# Patient Record
Sex: MALE | Race: WHITE | NOT HISPANIC OR LATINO
[De-identification: names, ages, dates, MRNs, and addresses within clinical notes are randomized per-mention and may not be internally consistent; named-entity substitution may affect disease eponyms.]

---

## 2017-03-28 ENCOUNTER — RX RENEWAL (OUTPATIENT)
Age: 72
End: 2017-03-28

## 2017-08-27 ENCOUNTER — RX RENEWAL (OUTPATIENT)
Age: 72
End: 2017-08-27

## 2018-05-21 ENCOUNTER — RX RENEWAL (OUTPATIENT)
Age: 73
End: 2018-05-21

## 2018-07-20 ENCOUNTER — RX RENEWAL (OUTPATIENT)
Age: 73
End: 2018-07-20

## 2018-08-28 ENCOUNTER — RX RENEWAL (OUTPATIENT)
Age: 73
End: 2018-08-28

## 2018-08-29 RX ORDER — LEVOTHYROXINE SODIUM 0.05 MG/1
50 TABLET ORAL DAILY
Qty: 90 | Refills: 0 | Status: DISCONTINUED | COMMUNITY
End: 2018-08-29

## 2018-10-15 ENCOUNTER — LABORATORY RESULT (OUTPATIENT)
Age: 73
End: 2018-10-15

## 2018-10-17 RX ORDER — VALSARTAN 160 MG/1
160 TABLET, COATED ORAL
Refills: 5 | Status: ACTIVE | COMMUNITY

## 2018-10-17 RX ORDER — EMPAGLIFLOZIN 25 MG/1
25 TABLET, FILM COATED ORAL DAILY
Refills: 0 | Status: ACTIVE | COMMUNITY

## 2018-10-17 RX ORDER — FLAXSEED OIL 1000 MG
1000 CAPSULE ORAL DAILY
Refills: 0 | Status: ACTIVE | COMMUNITY

## 2018-10-17 RX ORDER — VITAMIN K2 90 MCG
125 MCG CAPSULE ORAL DAILY
Refills: 0 | Status: ACTIVE | COMMUNITY

## 2018-10-17 RX ORDER — ROSUVASTATIN CALCIUM 10 MG/1
10 TABLET, FILM COATED ORAL DAILY
Refills: 0 | Status: ACTIVE | COMMUNITY

## 2018-10-19 ENCOUNTER — APPOINTMENT (OUTPATIENT)
Dept: ENDOCRINOLOGY | Facility: CLINIC | Age: 73
End: 2018-10-19
Payer: MEDICARE

## 2018-10-19 ENCOUNTER — APPOINTMENT (OUTPATIENT)
Dept: ENDOCRINOLOGY | Facility: CLINIC | Age: 73
End: 2018-10-19

## 2018-10-19 PROCEDURE — 76536 US EXAM OF HEAD AND NECK: CPT

## 2018-11-02 ENCOUNTER — APPOINTMENT (OUTPATIENT)
Dept: ENDOCRINOLOGY | Facility: CLINIC | Age: 73
End: 2018-11-02
Payer: MEDICARE

## 2018-11-02 ENCOUNTER — RESULT CHARGE (OUTPATIENT)
Age: 73
End: 2018-11-02

## 2018-11-02 ENCOUNTER — APPOINTMENT (OUTPATIENT)
Dept: ENDOCRINOLOGY | Facility: CLINIC | Age: 73
End: 2018-11-02

## 2018-11-02 VITALS
DIASTOLIC BLOOD PRESSURE: 64 MMHG | BODY MASS INDEX: 33.08 KG/M2 | HEIGHT: 73.5 IN | OXYGEN SATURATION: 98 % | SYSTOLIC BLOOD PRESSURE: 138 MMHG | HEART RATE: 64 BPM | WEIGHT: 255 LBS

## 2018-11-02 LAB — GLUCOSE BLDC GLUCOMTR-MCNC: 119

## 2018-11-02 PROCEDURE — 99214 OFFICE O/P EST MOD 30 MIN: CPT | Mod: 25

## 2018-11-02 PROCEDURE — 82962 GLUCOSE BLOOD TEST: CPT

## 2018-11-02 RX ORDER — LEVOTHYROXINE SODIUM 0.05 MG/1
50 TABLET ORAL DAILY
Qty: 90 | Refills: 1 | Status: DISCONTINUED | COMMUNITY
Start: 2018-08-28 | End: 2018-11-02

## 2019-01-16 ENCOUNTER — RX RENEWAL (OUTPATIENT)
Age: 74
End: 2019-01-16

## 2019-03-07 ENCOUNTER — TRANSCRIPTION ENCOUNTER (OUTPATIENT)
Age: 74
End: 2019-03-07

## 2019-03-07 LAB
ALBUMIN SERPL ELPH-MCNC: 4.3 G/DL
ALP BLD-CCNC: 59 U/L
ALT SERPL-CCNC: 19 U/L
ANION GAP SERPL CALC-SCNC: 15 MMOL/L
AST SERPL-CCNC: 21 U/L
BASOPHILS # BLD AUTO: 0.05 K/UL
BASOPHILS NFR BLD AUTO: 0.8 %
BILIRUB SERPL-MCNC: 0.3 MG/DL
BUN SERPL-MCNC: 18 MG/DL
CALCIUM SERPL-MCNC: 9.4 MG/DL
CHLORIDE SERPL-SCNC: 104 MMOL/L
CHOLEST SERPL-MCNC: 116 MG/DL
CHOLEST/HDLC SERPL: 3.2 RATIO
CO2 SERPL-SCNC: 22 MMOL/L
CREAT SERPL-MCNC: 1.07 MG/DL
CREAT SPEC-SCNC: 113 MG/DL
EOSINOPHIL # BLD AUTO: 0.16 K/UL
EOSINOPHIL NFR BLD AUTO: 2.6 %
GLUCOSE SERPL-MCNC: 129 MG/DL
HBA1C MFR BLD HPLC: 6.1 %
HCT VFR BLD CALC: 44.7 %
HDLC SERPL-MCNC: 36 MG/DL
HGB BLD-MCNC: 14.1 G/DL
IMM GRANULOCYTES NFR BLD AUTO: 0.5 %
LDLC SERPL CALC-MCNC: 46 MG/DL
LYMPHOCYTES # BLD AUTO: 1.29 K/UL
LYMPHOCYTES NFR BLD AUTO: 21.3 %
MAN DIFF?: NORMAL
MCHC RBC-ENTMCNC: 28.3 PG
MCHC RBC-ENTMCNC: 31.5 GM/DL
MCV RBC AUTO: 89.6 FL
MICROALBUMIN 24H UR DL<=1MG/L-MCNC: <1.2 MG/DL
MICROALBUMIN/CREAT 24H UR-RTO: NORMAL MG/G
MONOCYTES # BLD AUTO: 0.44 K/UL
MONOCYTES NFR BLD AUTO: 7.3 %
NEUTROPHILS # BLD AUTO: 4.09 K/UL
NEUTROPHILS NFR BLD AUTO: 67.5 %
PLATELET # BLD AUTO: 198 K/UL
POTASSIUM SERPL-SCNC: 4.6 MMOL/L
PROT SERPL-MCNC: 6.7 G/DL
RBC # BLD: 4.99 M/UL
RBC # FLD: 14.9 %
SODIUM SERPL-SCNC: 141 MMOL/L
T4 FREE SERPL-MCNC: 1.1 NG/DL
TRIGL SERPL-MCNC: 169 MG/DL
TSH SERPL-ACNC: 2.72 UIU/ML
WBC # FLD AUTO: 6.06 K/UL

## 2019-03-08 ENCOUNTER — RECORD ABSTRACTING (OUTPATIENT)
Age: 74
End: 2019-03-08

## 2019-03-08 DIAGNOSIS — Z86.39 PERSONAL HISTORY OF OTHER ENDOCRINE, NUTRITIONAL AND METABOLIC DISEASE: ICD-10-CM

## 2019-03-08 DIAGNOSIS — Z78.9 OTHER SPECIFIED HEALTH STATUS: ICD-10-CM

## 2019-03-11 ENCOUNTER — RESULT CHARGE (OUTPATIENT)
Age: 74
End: 2019-03-11

## 2019-03-11 ENCOUNTER — APPOINTMENT (OUTPATIENT)
Dept: ENDOCRINOLOGY | Facility: CLINIC | Age: 74
End: 2019-03-11
Payer: MEDICARE

## 2019-03-11 VITALS
SYSTOLIC BLOOD PRESSURE: 138 MMHG | HEART RATE: 69 BPM | HEIGHT: 73 IN | BODY MASS INDEX: 34.33 KG/M2 | DIASTOLIC BLOOD PRESSURE: 70 MMHG | WEIGHT: 259 LBS

## 2019-03-11 LAB — GLUCOSE BLDC GLUCOMTR-MCNC: 129

## 2019-03-11 PROCEDURE — 82962 GLUCOSE BLOOD TEST: CPT

## 2019-03-11 PROCEDURE — 99214 OFFICE O/P EST MOD 30 MIN: CPT | Mod: 25

## 2019-03-11 RX ORDER — LEVOTHYROXINE SODIUM 0.05 MG/1
50 TABLET ORAL
Refills: 0 | Status: DISCONTINUED | COMMUNITY
End: 2019-03-11

## 2019-03-11 NOTE — HISTORY OF PRESENT ILLNESS
[FreeTextEntry1] : Patient is seen today for a routine diabetic follow up.  Also with thyroid nodules and hypothyroidism.  \par Quality:  type 2\par Severity:  well controlled\par Duration of diabetes:  since 2003\par Associated Complications/ Symptoms:  foot ulcer on left in the past due to foot brace\par Modifying Factors:  Better with medication\par \par Patient tests blood glucose 1 times per day.   \par \par Current Diabetic Medication Regimen:\par Janumet XR 50/1000 mg BID\par Jardiance 25 mg daily\par \par hx of large left thyroid nodule s/p benign FNA in 2004.\par US in 2017 showed enlarged LMP 4.5 cm nodule and new LLP 2.5 cm nodule, both benign on FNA in 11/2017.\par Also had attempted FNA of RMP 1.4 cm nodule, but only rare follicular cells seen (non-diagnostic). \par \par PCP prescribed medrol dose pack for right leg pain and ? nerve compression.  Will be going to PT.

## 2019-03-11 NOTE — ASSESSMENT
[FreeTextEntry1] : 74 year old male with type 2 DM, Multinodular goiter s/p benign FNA of left mid pole 4.6 cm nodule and left lower pole 2.5 cm nodule in 2017, mild primary hypothyroidism, hyperlipidemia and HTN.   His glycemic control is excellent.  \par \par 1.  Type 2 DM-  continue current Rx\par 2.  Multinodular goiter- repeat US in 10/2019\par 3.  hypothyroidism-  Now euthyroid, continue LT4 75 mcg daily.\par 4.  hyperlipidemia-  continue rosuvastatin\par 5.  HTN-  continue ARB.

## 2019-03-11 NOTE — PHYSICAL EXAM
[No Acute Distress] : no acute distress [Well Nourished] : well nourished [Normal Sclera/Conjunctiva] : normal sclera/conjunctiva [No Proptosis] : no proptosis [No LAD] : no lymphadenopathy [No Respiratory Distress] : no respiratory distress [Clear to Auscultation] : lungs were clear to auscultation bilaterally [Normal Rate] : heart rate was normal  [Normal S1, S2] : normal S1 and S2 [Regular Rhythm] : with a regular rhythm [Murmurs] : no murmurs [Normal Gait] : normal gait [No Clubbing, Cyanosis] : no clubbing  or cyanosis of the fingernails [Normal Insight/Judgement] : insight and judgment were intact [Normal Affect] : the affect was normal [Normal Mood] : the mood was normal [Acanthosis Nigricans] : no acanthosis nigricans [de-identified] : nodular thyroid, no LAD

## 2019-03-11 NOTE — REVIEW OF SYSTEMS
[Joint Pain] : joint pain [Muscle Weakness] : muscle weakness [Recent Weight Gain (___ Lbs)] : no recent weight gain [Recent Weight Loss (___ Lbs)] : no recent weight loss [Chest Pain] : no chest pain [Shortness Of Breath] : no shortness of breath [Nausea] : no nausea [Pain/Numbness of Digits] : no pain/numbness of digits

## 2019-04-29 ENCOUNTER — RX RENEWAL (OUTPATIENT)
Age: 74
End: 2019-04-29

## 2019-07-12 ENCOUNTER — RX RENEWAL (OUTPATIENT)
Age: 74
End: 2019-07-12

## 2019-07-17 ENCOUNTER — APPOINTMENT (OUTPATIENT)
Dept: PULMONOLOGY | Facility: CLINIC | Age: 74
End: 2019-07-17

## 2019-08-13 LAB
ALBUMIN SERPL ELPH-MCNC: 4.3 G/DL
ALP BLD-CCNC: 44 U/L
ALT SERPL-CCNC: 20 U/L
ANION GAP SERPL CALC-SCNC: 9 MMOL/L
AST SERPL-CCNC: 21 U/L
BILIRUB SERPL-MCNC: 0.3 MG/DL
BUN SERPL-MCNC: 13 MG/DL
CALCIUM SERPL-MCNC: 9.9 MG/DL
CHLORIDE SERPL-SCNC: 106 MMOL/L
CHOLEST SERPL-MCNC: 122 MG/DL
CHOLEST/HDLC SERPL: 3.8 RATIO
CO2 SERPL-SCNC: 26 MMOL/L
CREAT SERPL-MCNC: 1.02 MG/DL
GLUCOSE SERPL-MCNC: 124 MG/DL
HBA1C MFR BLD HPLC: 5.7 %
HDLC SERPL-MCNC: 32 MG/DL
LDLC SERPL CALC-MCNC: 43 MG/DL
POTASSIUM SERPL-SCNC: 4 MMOL/L
PROT SERPL-MCNC: 6.8 G/DL
SODIUM SERPL-SCNC: 141 MMOL/L
T4 FREE SERPL-MCNC: 1.5 NG/DL
TRIGL SERPL-MCNC: 233 MG/DL
TSH SERPL-ACNC: 0.52 UIU/ML

## 2019-08-14 ENCOUNTER — APPOINTMENT (OUTPATIENT)
Dept: ENDOCRINOLOGY | Facility: CLINIC | Age: 74
End: 2019-08-14
Payer: MEDICARE

## 2019-08-14 VITALS
SYSTOLIC BLOOD PRESSURE: 134 MMHG | WEIGHT: 250 LBS | HEART RATE: 70 BPM | BODY MASS INDEX: 33.13 KG/M2 | HEIGHT: 73 IN | DIASTOLIC BLOOD PRESSURE: 74 MMHG

## 2019-08-14 LAB — GLUCOSE BLDC GLUCOMTR-MCNC: 96

## 2019-08-14 PROCEDURE — 99214 OFFICE O/P EST MOD 30 MIN: CPT | Mod: 25

## 2019-08-14 PROCEDURE — 82962 GLUCOSE BLOOD TEST: CPT

## 2019-08-14 NOTE — HISTORY OF PRESENT ILLNESS
[FreeTextEntry1] : Patient is seen today for a routine diabetic follow up.  Also with thyroid nodules and hypothyroidism.  History of a large left thyroid nodule s/p benign FNA in 2004. US in 2017 showed enlarged Left mid pole 4.5 cm nodule and new left lower pole 2.5 cm nodule, both benign on FNA in 11/2017.Also had attempted FNA of Right mid pole 1.4 cm nodule, but only rare follicular cells seen (non-diagnostic). \par \par Quality:  type 2  DM\par Severity:  well controlled\par Duration of diabetes:  since 2003\par Associated Complications/ Symptoms:  foot ulcer on left in the past due to foot brace\par Modifying Factors:  Better with medication\par \par Patient tests blood glucose 1 times per day.   Reports improved BG lately.  \par \par Current Diabetic Medication Regimen:\par Janumet XR 50/1000 mg BID\par Jardiance 25 mg daily\par \par Was hospitalized in July for perforated appendicitis and now on IV Zosyn.    No surgery performed.  Seeing ID and surgeon now.

## 2019-08-14 NOTE — ASSESSMENT
[FreeTextEntry1] : 74 year old male with type 2 DM, Multinodular goiter s/p benign FNA of left mid pole 4.6 cm nodule and left lower pole 2.5 cm nodule in 2017, mild primary hypothyroidism, hyperlipidemia and HTN.   His glycemic control is excellent.  \par \par 1.  Type 2 DM-  continue current Rx\par 2.  Multinodular goiter- repeat US in 10/2019\par 3.  hypothyroidism-  Euthyroid, continue LT4 75 mcg daily.\par 4.  hyperlipidemia-  continue rosuvastatin\par 5.  HTN-  continue ARB.

## 2019-08-14 NOTE — REVIEW OF SYSTEMS
[Recent Weight Loss (___ Lbs)] : recent [unfilled] ~Ulb weight loss [Shortness Of Breath] : no shortness of breath [Chest Pain] : no chest pain [Abdominal Pain] : no abdominal pain [Polydipsia] : no polydipsia

## 2019-08-14 NOTE — PHYSICAL EXAM
[No Acute Distress] : no acute distress [Well Nourished] : well nourished [No Proptosis] : no proptosis [Normal Sclera/Conjunctiva] : normal sclera/conjunctiva [No LAD] : no lymphadenopathy [No Respiratory Distress] : no respiratory distress [Normal Rate] : heart rate was normal  [Clear to Auscultation] : lungs were clear to auscultation bilaterally [Normal S1, S2] : normal S1 and S2 [Regular Rhythm] : with a regular rhythm [Normal Gait] : normal gait [Murmurs] : no murmurs [No Clubbing, Cyanosis] : no clubbing  or cyanosis of the fingernails [Normal Insight/Judgement] : insight and judgment were intact [Normal Mood] : the mood was normal [Normal Affect] : the affect was normal [Acanthosis Nigricans] : no acanthosis nigricans [de-identified] : nodular thyroid, no LAD

## 2019-10-22 ENCOUNTER — RX RENEWAL (OUTPATIENT)
Age: 74
End: 2019-10-22

## 2019-10-24 ENCOUNTER — APPOINTMENT (OUTPATIENT)
Dept: ENDOCRINOLOGY | Facility: CLINIC | Age: 74
End: 2019-10-24
Payer: MEDICARE

## 2019-10-24 PROCEDURE — 76536 US EXAM OF HEAD AND NECK: CPT

## 2019-10-30 ENCOUNTER — TRANSCRIPTION ENCOUNTER (OUTPATIENT)
Age: 74
End: 2019-10-30

## 2019-10-30 NOTE — PROCEDURE
[Other: ___] : [unfilled]. All measurements will be reported as longitudinal x emi-posterior x transverse. [Report dated ___] : Report dated [unfilled] [Multinodular Goiter] : multinodular goiter [] : a heterogeneous parenchyma  [Mid] : mid pole there is a  [Right Thyroid] : right [Hypoechoic] : hypoechoic nodule [Left Thyroid] : left [Lower] : lower pole there is a  [Isoechoic] : isoechoic nodule [FreeTextEntry1] : 3.5 x 1.8 x 1.3 [FreeTextEntry5] : 6 x 2.8 x 3.2 [FreeTextEntry2] : 0.3 [de-identified] : The previously biopsied large left 4.2 cm nodule is not clearly visualized on this study in background of heterogeneous lobe.     [FreeTextEntry3] : 2.2 x 2.5 x 1.6 [FreeTextEntry4] : The nodule is stable (s/p benign FNA in 2017).

## 2019-10-30 NOTE — IMPRESSION
[FreeTextEntry1] : Heterogeneous, multinodular gland with several stable nodules as described above.  The previously visualized left 4.2 cm nodule was not clearly identified on this study.  Would repeat imaging in one year for continued surveillance.

## 2020-01-08 ENCOUNTER — RX RENEWAL (OUTPATIENT)
Age: 75
End: 2020-01-08

## 2020-02-24 ENCOUNTER — APPOINTMENT (OUTPATIENT)
Dept: ENDOCRINOLOGY | Facility: CLINIC | Age: 75
End: 2020-02-24
Payer: MEDICARE

## 2020-02-24 VITALS
BODY MASS INDEX: 29.62 KG/M2 | DIASTOLIC BLOOD PRESSURE: 70 MMHG | SYSTOLIC BLOOD PRESSURE: 122 MMHG | HEIGHT: 78 IN | WEIGHT: 256 LBS

## 2020-02-24 LAB
ALBUMIN SERPL ELPH-MCNC: 4.2 G/DL
ALP BLD-CCNC: 52 U/L
ALT SERPL-CCNC: 23 U/L
ANION GAP SERPL CALC-SCNC: 12 MMOL/L
AST SERPL-CCNC: 20 U/L
BASOPHILS # BLD AUTO: 0.02 K/UL
BASOPHILS NFR BLD AUTO: 0.4 %
BILIRUB SERPL-MCNC: 0.3 MG/DL
BUN SERPL-MCNC: 13 MG/DL
CALCIUM SERPL-MCNC: 9.1 MG/DL
CHLORIDE SERPL-SCNC: 104 MMOL/L
CHOLEST SERPL-MCNC: 114 MG/DL
CHOLEST/HDLC SERPL: 2.9 RATIO
CO2 SERPL-SCNC: 25 MMOL/L
CREAT SERPL-MCNC: 0.96 MG/DL
CREAT SPEC-SCNC: 82 MG/DL
EOSINOPHIL # BLD AUTO: 0.18 K/UL
EOSINOPHIL NFR BLD AUTO: 3.7 %
ESTIMATED AVERAGE GLUCOSE: 120 MG/DL
GLUCOSE SERPL-MCNC: 131 MG/DL
HBA1C MFR BLD HPLC: 5.8 %
HCT VFR BLD CALC: 44.2 %
HDLC SERPL-MCNC: 39 MG/DL
HGB BLD-MCNC: 14 G/DL
IMM GRANULOCYTES NFR BLD AUTO: 0.6 %
LDLC SERPL CALC-MCNC: 49 MG/DL
LYMPHOCYTES # BLD AUTO: 0.96 K/UL
LYMPHOCYTES NFR BLD AUTO: 20 %
MAN DIFF?: NORMAL
MCHC RBC-ENTMCNC: 28.2 PG
MCHC RBC-ENTMCNC: 31.7 GM/DL
MCV RBC AUTO: 89.1 FL
MICROALBUMIN 24H UR DL<=1MG/L-MCNC: <1.2 MG/DL
MICROALBUMIN/CREAT 24H UR-RTO: NORMAL MG/G
MONOCYTES # BLD AUTO: 0.44 K/UL
MONOCYTES NFR BLD AUTO: 9.1 %
NEUTROPHILS # BLD AUTO: 3.18 K/UL
NEUTROPHILS NFR BLD AUTO: 66.2 %
PLATELET # BLD AUTO: 173 K/UL
POTASSIUM SERPL-SCNC: 4.5 MMOL/L
PROT SERPL-MCNC: 6.3 G/DL
RBC # BLD: 4.96 M/UL
RBC # FLD: 14.9 %
SODIUM SERPL-SCNC: 141 MMOL/L
T4 FREE SERPL-MCNC: 1.1 NG/DL
TRIGL SERPL-MCNC: 132 MG/DL
TSH SERPL-ACNC: 2.36 UIU/ML
WBC # FLD AUTO: 4.81 K/UL

## 2020-02-24 PROCEDURE — 99214 OFFICE O/P EST MOD 30 MIN: CPT

## 2020-02-24 NOTE — REVIEW OF SYSTEMS
[Fatigue] : fatigue [Visual Field Defect] : visual field defect [SOB on Exertion] : shortness of breath during exertion [Polyuria] : polyuria [Nocturia] : nocturia [Recent Weight Gain (___ Lbs)] : no recent weight gain [Recent Weight Loss (___ Lbs)] : no recent weight loss [Dysphagia] : no dysphagia [Blurry Vision] : no blurred vision [Dysphonia] : no dysphonia [Neck Pain] : no neck pain [Palpitations] : no palpitations [Chest Pain] : no chest pain [Nausea] : no nausea [Vomiting] : no vomiting was observed [Constipation] : no constipation [Joint Pain] : no joint pain [Diarrhea] : no diarrhea [Joint Stiffness] : no joint stiffness [Acanthosis] : no acanthosis  [Headache] : no headaches [Dry Skin] : no dry skin [Tremors] : no tremors [Depression] : no depression [Anxiety] : no anxiety [Polydipsia] : no polydipsia [Heat Intolerance] : heat tolerant [Cold Intolerance] : cold tolerant [FreeTextEntry3] : has new glasses

## 2020-02-24 NOTE — ASSESSMENT
[FreeTextEntry1] : T2DM\par -Continue Janumet and Jardiance RX\par -Continue to exercise as tolerated, goal of 30 mins a day 5x a week\par -Eat "junk food" in moderation\par -Continue to follow up with ophtho and podiatry\par \par \par HLD\par -Continue statin\par \par HTN\par -Continue ARB\par \par Hypothyroid\par -Continue current dose of LT4\par \par \par Thyroid Nodules\par -Repeat thyroid ultrasound 10/2020 \par \par Labs before next apt with Dr. Alfaro 7/2020

## 2020-02-24 NOTE — PHYSICAL EXAM
[Alert] : alert [Well Nourished] : well nourished [No Acute Distress] : no acute distress [Normal Sclera/Conjunctiva] : normal sclera/conjunctiva [Well Developed] : well developed [Supple] : the neck was supple [Normal Hearing] : hearing was normal [No Accessory Muscle Use] : no accessory muscle use [Normal Rate and Effort] : normal respiratory rhythm and effort [Clear to Auscultation] : lungs were clear to auscultation bilaterally [Normal Rate] : heart rate was normal  [Normal S1, S2] : normal S1 and S2 [Regular Rhythm] : with a regular rhythm [No Edema] : there was no peripheral edema [Not Tender] : non-tender [Soft] : abdomen soft [Post Cervical Nodes] : posterior cervical nodes [Normal] : normal and non tender [Normal Gait] : normal gait [Anterior Cervical Nodes] : anterior cervical nodes [Acanthosis Nigricans] : no acanthosis nigricans [No Tremors] : no tremors [No Rash] : no rash [Oriented x3] : oriented to person, place, and time

## 2020-02-24 NOTE — HISTORY OF PRESENT ILLNESS
[FreeTextEntry1] : Quality: type 2 DM\par Severity: well controlled\par Duration of diabetes: since 2003\par Associated Complications/ Symptoms: foot ulcer on left in the past due to foot brace\par Modifying Factors: Better with medication\par \par SMBG\par Checks BS approx daily at home, always fasting around 120s\par \par HGA1C 5.8\par \par Current drug regimen\par Janumet XR 50/1000 BID\par Jardiance 25 mg daily\par \par Eye exam: 1/2020- denies DR \par Foot exam: sees podiatry every 2 months, cuts the toe nails- denies numbness/tingling in b/l feet \par Kidney disease: denies \par Heart disease: denies \par \par Weight: relatively stable\par Diet: pt cooks 70%, eats out about 1x a week\par B: toast or roll, sometimes eggs\par L: leftovers/cold cuts\par D: meat, veggie, starch\par S: tortilla chips,once and a while chocolate \par Exercise: walks the dog when weather allows \par Smoking: denies \par \par \par HLD\par Triglycerides 132, Cholesterol 114, LDL 49, HDl 39\par Rosuvastatin 10 mg daily\par \par HTN\par BP in office 122/70 \par Valsartan 160 mg daily\par \par HypoThyroid\par TFTs WNL\par LT4 75 mcg daily\par Patient advised to take every day in the morning, on an empty stomach, and with no other medications. \par \par Thyroid nodules\par  s/p benign FNA of left mid pole 4.6 cm nodule and left lower pole 2.5 cm nodule in 2017\par 10/2019 ultrasound -stable nodules

## 2020-04-25 ENCOUNTER — RX RENEWAL (OUTPATIENT)
Age: 75
End: 2020-04-25

## 2020-07-01 ENCOUNTER — APPOINTMENT (OUTPATIENT)
Dept: ENDOCRINOLOGY | Facility: CLINIC | Age: 75
End: 2020-07-01
Payer: MEDICARE

## 2020-07-01 VITALS
HEIGHT: 78 IN | BODY MASS INDEX: 29.85 KG/M2 | WEIGHT: 258 LBS | DIASTOLIC BLOOD PRESSURE: 80 MMHG | SYSTOLIC BLOOD PRESSURE: 130 MMHG | HEART RATE: 69 BPM

## 2020-07-01 DIAGNOSIS — E03.9 HYPOTHYROIDISM, UNSPECIFIED: ICD-10-CM

## 2020-07-01 LAB — GLUCOSE BLDC GLUCOMTR-MCNC: 92

## 2020-07-01 PROCEDURE — 99214 OFFICE O/P EST MOD 30 MIN: CPT | Mod: 25

## 2020-07-01 PROCEDURE — 36415 COLL VENOUS BLD VENIPUNCTURE: CPT

## 2020-07-01 PROCEDURE — 82962 GLUCOSE BLOOD TEST: CPT

## 2020-07-01 RX ORDER — PIPERACILLIN SODIUM AND TAZOBACTAM SODIUM 4; .5 G/20ML; G/20ML
4.5 (4-0.5) INJECTION, POWDER, LYOPHILIZED, FOR SOLUTION INTRAVENOUS
Refills: 0 | Status: DISCONTINUED | COMMUNITY
End: 2020-07-01

## 2020-07-01 NOTE — HISTORY OF PRESENT ILLNESS
[FreeTextEntry1] : Patient is seen today for a routine diabetic follow up.  Also with thyroid nodules and hypothyroidism.  History of a large left thyroid nodule s/p benign FNA in 2004. US in 2017 showed enlarged Left mid pole 4.5 cm nodule and new left lower pole 2.5 cm nodule, both benign on FNA in 11/2017.Also had attempted FNA of Right mid pole 1.4 cm nodule, but only rare follicular cells seen (non-diagnostic). \par \par Quality:  type 2  DM\par Severity:  well controlled\par Duration of diabetes:  since 2003\par Associated Complications/ Symptoms:  foot ulcer on left in the past due to foot brace\par Modifying Factors:  Better with medication\par \par Patient tests blood glucose 1 times per day.    Most values under 130 mg/dl when fasting.  \par \par Current Diabetic Medication Regimen:\par Janumet XR 50/1000 mg BID\par Jardiance 25 mg daily\par \par Admits to some noncompliance with diet lately.  \par Denies any associated neck pain.  \par

## 2020-07-01 NOTE — REASON FOR VISIT
[Follow - Up] : a follow-up visit [Hypothyroidism] : hypothyroidism [Thyroid nodule/ MNG] : thyroid nodule/ MNG [DM Type 2] : DM Type 2

## 2020-07-01 NOTE — REVIEW OF SYSTEMS
[Neck Pain] : no neck pain [Nausea] : no nausea [Shortness Of Breath] : no shortness of breath [Chest Pain] : no chest pain

## 2020-07-01 NOTE — PHYSICAL EXAM
[Healthy Appearance] : healthy appearance [No Acute Distress] : no acute distress [Normal Sclera/Conjunctiva] : normal sclera/conjunctiva [No Proptosis] : no proptosis [Supple] : the neck was supple [No LAD] : no lymphadenopathy [No Respiratory Distress] : no respiratory distress [Clear to Auscultation] : lungs were clear to auscultation bilaterally [No Murmurs] : no murmurs [Normal S1, S2] : normal S1 and S2 [Normal Rate] : heart rate was normal [Regular Rhythm] : with a regular rhythm [Right Foot Was Examined] : right foot ~C was examined [Left Foot Was Examined] : left foot ~C was examined [Normal] : normal [2+] : 2+ in the dorsalis pedis [Normal Affect] : the affect was normal [Normal Insight/Judgement] : insight and judgment were intact [Normal Mood] : the mood was normal [Acanthosis Nigricans] : no acanthosis nigricans [Diminished Throughout Both Feet] : normal tactile sensation with monofilament testing throughout both feet [de-identified] : Nodular thyroid  [FreeTextEntry5] : Club foot [FreeTextEntry2] : Onychomycosis

## 2020-07-01 NOTE — DATA REVIEWED
[FreeTextEntry1] : Thyroid US:  10/24/2019:\par In the right mid pole there is a  hypoechoic nodule. It measures 1.4 x 0.8 x 0.8 cm. The nodule has a smaller circumscribed central region measuring 0.9 x 0.6 cm. The nodule appears stable (s/p non-diagnostic FNA in 2017). \par The previously biopsied large left 4.2 cm nodule is not clearly visualized on this study in background of heterogeneous lobe. \par In the right mid pole there is a  hypoechoic nodule. It measures 0.5 x 0.3 cm. The nodule is anteriorly located and appears stable. \par In the right lower pole there is a  hypoechoic nodule. It measures 1 x 0.8 x 0.7 cm. The nodule is stable. \par In the left lower pole there is a  isoechoic nodule. It measures 2.2 x 2.5 x 1.6 cm. The nodule is stable (s/p benign FNA in 2017). \par \par thyroid US 10/15/2018:  \par Stable nodules.

## 2020-07-01 NOTE — ASSESSMENT
[FreeTextEntry1] : 75 year old male with type 2 DM, Multinodular goiter s/p benign FNA of left mid pole 4.6 cm nodule and left lower pole 2.5 cm nodule in 2017, mild primary hypothyroidism, hyperlipidemia and HTN.   His glycemic control seems reasonable, but need labs to confirm.   \par \par 1.  Type 2 DM-   Labs drawn in office today.  If A1c remains so low, consider decrease in dose of Jardiance.  \par 2.  Multinodular goiter- repeat US in 10/2019\par 3.  hypothyroidism-  Euthyroid, continue LT4 75 mcg daily.\par 4.  hyperlipidemia-  continue rosuvastatin\par 5.  HTN-  continue ARB.

## 2020-07-06 ENCOUNTER — APPOINTMENT (OUTPATIENT)
Dept: ENDOCRINOLOGY | Facility: CLINIC | Age: 75
End: 2020-07-06

## 2020-07-07 ENCOUNTER — TRANSCRIPTION ENCOUNTER (OUTPATIENT)
Age: 75
End: 2020-07-07

## 2020-07-07 LAB
ALBUMIN SERPL ELPH-MCNC: 4.7 G/DL
ALP BLD-CCNC: 52 U/L
ALT SERPL-CCNC: 30 U/L
ANION GAP SERPL CALC-SCNC: 12 MMOL/L
AST SERPL-CCNC: 33 U/L
BASOPHILS # BLD AUTO: 0.04 K/UL
BASOPHILS NFR BLD AUTO: 0.7 %
BILIRUB SERPL-MCNC: 0.4 MG/DL
BUN SERPL-MCNC: 16 MG/DL
CALCIUM SERPL-MCNC: 9.9 MG/DL
CHLORIDE SERPL-SCNC: 103 MMOL/L
CHOLEST SERPL-MCNC: 121 MG/DL
CHOLEST/HDLC SERPL: 3 RATIO
CO2 SERPL-SCNC: 28 MMOL/L
CREAT SERPL-MCNC: 1.02 MG/DL
EOSINOPHIL # BLD AUTO: 0.12 K/UL
EOSINOPHIL NFR BLD AUTO: 2 %
ESTIMATED AVERAGE GLUCOSE: 128 MG/DL
GLUCOSE SERPL-MCNC: 90 MG/DL
HBA1C MFR BLD HPLC: 6.1 %
HCT VFR BLD CALC: 47.2 %
HDLC SERPL-MCNC: 40 MG/DL
HGB BLD-MCNC: 14.5 G/DL
IMM GRANULOCYTES NFR BLD AUTO: 0.8 %
LDLC SERPL CALC-MCNC: 45 MG/DL
LYMPHOCYTES # BLD AUTO: 1.26 K/UL
LYMPHOCYTES NFR BLD AUTO: 20.9 %
MAN DIFF?: NORMAL
MCHC RBC-ENTMCNC: 28.2 PG
MCHC RBC-ENTMCNC: 30.7 GM/DL
MCV RBC AUTO: 91.7 FL
MONOCYTES # BLD AUTO: 0.54 K/UL
MONOCYTES NFR BLD AUTO: 9 %
NEUTROPHILS # BLD AUTO: 4.01 K/UL
NEUTROPHILS NFR BLD AUTO: 66.6 %
PLATELET # BLD AUTO: 182 K/UL
POTASSIUM SERPL-SCNC: 4.8 MMOL/L
PROT SERPL-MCNC: 7.1 G/DL
RBC # BLD: 5.15 M/UL
RBC # FLD: 14.7 %
SODIUM SERPL-SCNC: 142 MMOL/L
T3FREE SERPL-MCNC: 2.67 PG/ML
T4 FREE SERPL-MCNC: 1.2 NG/DL
TRIGL SERPL-MCNC: 177 MG/DL
TSH SERPL-ACNC: 2.47 UIU/ML
VIT B12 SERPL-MCNC: 300 PG/ML
WBC # FLD AUTO: 6.02 K/UL

## 2020-07-16 ENCOUNTER — RX RENEWAL (OUTPATIENT)
Age: 75
End: 2020-07-16

## 2020-10-08 ENCOUNTER — APPOINTMENT (OUTPATIENT)
Dept: ENDOCRINOLOGY | Facility: CLINIC | Age: 75
End: 2020-10-08
Payer: MEDICARE

## 2020-10-08 DIAGNOSIS — E04.2 NONTOXIC MULTINODULAR GOITER: ICD-10-CM

## 2020-10-08 PROCEDURE — 76536 US EXAM OF HEAD AND NECK: CPT

## 2020-10-12 ENCOUNTER — TRANSCRIPTION ENCOUNTER (OUTPATIENT)
Age: 75
End: 2020-10-12

## 2020-10-12 PROBLEM — E04.2 NONTOXIC MULTINODULAR GOITER: Status: ACTIVE | Noted: 2018-10-17

## 2020-10-12 NOTE — PROCEDURE
[Other: ___] : [unfilled]. All measurements will be reported as longitudinal x emi-posterior x transverse. [Report dated ___] : Report dated [unfilled] [Multinodular Goiter] : multinodular goiter [] : a heterogeneous parenchyma [FreeTextEntry1] : 4.1 x 2.2 x 1.5 [FreeTextEntry5] : 6 x 3.3 x 3  [FreeTextEntry2] : 0.7 [de-identified] : There are multiple nodules visualized including:\par right mid pole 1.4 x 1 x 0.8 cm hypoechoic nodule with central circumscribed area measuring 0.7 x 0.6 x 0.4 cm (stable s/p non-diagnostic FNA in 2017)\par right upper pole anterior 0.5 x 0.3 cm hypoechoic nodule (stable)\par right lower pole 1 x 0.8 x 0.7 cm hypoechoic nodule (stable)\par left lower pole 2.2 x 2.3 x 1.6 cm hypoechoic nodule (stable, s/p benign FNA in 2017)

## 2020-10-12 NOTE — IMPRESSION
[FreeTextEntry1] : Heterogeneous, multinodular gland containing several nodules, all of which appear stable when compared to previous imaging studies.

## 2020-11-09 ENCOUNTER — RX RENEWAL (OUTPATIENT)
Age: 75
End: 2020-11-09

## 2020-12-23 ENCOUNTER — RX RENEWAL (OUTPATIENT)
Age: 75
End: 2020-12-23

## 2020-12-23 RX ORDER — SITAGLIPTIN AND METFORMIN HYDROCHLORIDE 50; 1000 MG/1; MG/1
50-1000 TABLET, FILM COATED, EXTENDED RELEASE ORAL TWICE DAILY
Qty: 180 | Refills: 1 | Status: ACTIVE | COMMUNITY
Start: 2019-01-16 | End: 1900-01-01

## 2021-01-04 ENCOUNTER — APPOINTMENT (OUTPATIENT)
Dept: ENDOCRINOLOGY | Facility: CLINIC | Age: 76
End: 2021-01-04

## 2021-09-04 ENCOUNTER — TRANSCRIPTION ENCOUNTER (OUTPATIENT)
Age: 76
End: 2021-09-04

## 2021-10-11 ENCOUNTER — APPOINTMENT (OUTPATIENT)
Dept: PULMONOLOGY | Facility: CLINIC | Age: 76
End: 2021-10-11
Payer: MEDICARE

## 2021-10-11 VITALS
WEIGHT: 258 LBS | DIASTOLIC BLOOD PRESSURE: 88 MMHG | SYSTOLIC BLOOD PRESSURE: 150 MMHG | HEART RATE: 72 BPM | BODY MASS INDEX: 29.82 KG/M2 | OXYGEN SATURATION: 97 %

## 2021-10-11 PROCEDURE — 99204 OFFICE O/P NEW MOD 45 MIN: CPT

## 2021-10-11 RX ORDER — TRIAMCINOLONE ACETONIDE 1 MG/G
0.1 CREAM TOPICAL
Qty: 60 | Refills: 0 | Status: DISCONTINUED | COMMUNITY
Start: 2021-09-23

## 2021-10-11 RX ORDER — METHYLPREDNISOLONE 4 MG/1
4 TABLET ORAL
Qty: 21 | Refills: 0 | Status: DISCONTINUED | COMMUNITY
Start: 2021-09-04

## 2022-09-12 ENCOUNTER — OFFICE (OUTPATIENT)
Dept: URBAN - METROPOLITAN AREA CLINIC 104 | Facility: CLINIC | Age: 77
Setting detail: OPHTHALMOLOGY
End: 2022-09-12
Payer: MEDICARE

## 2022-09-12 DIAGNOSIS — E11.9: ICD-10-CM

## 2022-09-12 DIAGNOSIS — H02.834: ICD-10-CM

## 2022-09-12 DIAGNOSIS — H52.4: ICD-10-CM

## 2022-09-12 DIAGNOSIS — H01.001: ICD-10-CM

## 2022-09-12 DIAGNOSIS — H43.393: ICD-10-CM

## 2022-09-12 DIAGNOSIS — H01.002: ICD-10-CM

## 2022-09-12 DIAGNOSIS — H02.831: ICD-10-CM

## 2022-09-12 DIAGNOSIS — H01.005: ICD-10-CM

## 2022-09-12 DIAGNOSIS — H01.004: ICD-10-CM

## 2022-09-12 DIAGNOSIS — H25.13: ICD-10-CM

## 2022-09-12 PROCEDURE — 99203 OFFICE O/P NEW LOW 30 MIN: CPT | Performed by: OPHTHALMOLOGY

## 2022-09-12 PROCEDURE — 92015 DETERMINE REFRACTIVE STATE: CPT | Performed by: OPHTHALMOLOGY

## 2022-09-12 PROCEDURE — 92136 OPHTHALMIC BIOMETRY: CPT | Performed by: OPHTHALMOLOGY

## 2022-09-12 ASSESSMENT — KERATOMETRY
OS_CYLAXISANGLE_DEGREES: 007
OD_K1K2_AVERAGE: 43.185
OD_CYLPOWER_DEGREES: 2.21
OD_K2POWER_DIOPTERS: 44.29
OS_AXISANGLE_DEGREES: 007
OS_K2POWER_DIOPTERS: 44.41
OS_K1POWER_DIOPTERS: 42.94
OD_CYLAXISANGLE_DEGREES: 177
OD_AXISANGLE_DEGREES: 177
OD_K1POWER_DIOPTERS: 42.08
OS_CYLPOWER_DEGREES: 1.47
OD_K1POWER_DIOPTERS: 42.08
OS_K1POWER_DIOPTERS: 42.94
OD_AXISANGLE_DEGREES: 87
OD_K2POWER_DIOPTERS: 44.29
OS_AXISANGLE2_DEGREES: 007
OD_AXISANGLE2_DEGREES: 177
OS_K2POWER_DIOPTERS: 44.41
OS_AXISANGLE_DEGREES: 97
OS_K1K2_AVERAGE: 43.67

## 2022-09-12 ASSESSMENT — SPHEQUIV_DERIVED
OD_SPHEQUIV: 0.5
OD_SPHEQUIV: 0.75
OS_SPHEQUIV: 0.25
OS_SPHEQUIV: 0

## 2022-09-12 ASSESSMENT — REFRACTION_CURRENTRX
OD_CYLINDER: -1.50
OD_VPRISM_DIRECTION: PROGS
OD_SPHERE: +1.25
OD_ADD: +2.25
OS_AXIS: 075
OS_OVR_VA: 20/
OD_OVR_VA: 20/
OD_AXIS: 102
OS_ADD: +2.25
OS_VPRISM_DIRECTION: PROGS
OS_SPHERE: +1.00
OS_CYLINDER: -1.00

## 2022-09-12 ASSESSMENT — REFRACTION_MANIFEST
OS_AXIS: 075
OD_VA1: 20/40
OD_ADD: +2.50
OD_CYLINDER: -1.50
OD_SPHERE: +1.50
OS_CYLINDER: -1.50
OS_VA2: 20/20
OD_AXIS: 100
OS_SPHERE: +1.00
OD_VA2: 20/20
OS_ADD: +2.50
OS_VA1: 20/40+

## 2022-09-12 ASSESSMENT — AXIALLENGTH_DERIVED
OD_AL: 23.4167
OS_AL: 23.53
OD_AL: 23.5131
OS_AL: 23.4313

## 2022-09-12 ASSESSMENT — VISUAL ACUITY
OD_BCVA: 20/70
OS_BCVA: 20/60

## 2022-09-12 ASSESSMENT — LID EXAM ASSESSMENTS
OS_BLEPHARITIS: LLL LUL 1+
OD_BLEPHARITIS: RLL RUL 1+

## 2022-09-12 ASSESSMENT — REFRACTION_AUTOREFRACTION
OD_CYLINDER: -2.00
OS_SPHERE: +0.75
OD_SPHERE: +1.50
OS_CYLINDER: -1.50
OS_AXIS: 081
OD_AXIS: 090

## 2022-09-12 ASSESSMENT — LID POSITION - DERMATOCHALASIS
OD_DERMATOCHALASIS: RUL 2+
OS_DERMATOCHALASIS: LUL 2+

## 2022-09-12 ASSESSMENT — CONFRONTATIONAL VISUAL FIELD TEST (CVF)
OD_FINDINGS: FULL
OS_FINDINGS: FULL

## 2022-09-12 ASSESSMENT — TONOMETRY
OD_IOP_MMHG: 19
OS_IOP_MMHG: 19

## 2023-05-01 ENCOUNTER — OFFICE (OUTPATIENT)
Dept: URBAN - METROPOLITAN AREA CLINIC 104 | Facility: CLINIC | Age: 78
Setting detail: OPHTHALMOLOGY
End: 2023-05-01
Payer: MEDICARE

## 2023-05-01 DIAGNOSIS — H25.11: ICD-10-CM

## 2023-05-01 DIAGNOSIS — H02.834: ICD-10-CM

## 2023-05-01 DIAGNOSIS — H01.002: ICD-10-CM

## 2023-05-01 DIAGNOSIS — H43.393: ICD-10-CM

## 2023-05-01 DIAGNOSIS — H01.005: ICD-10-CM

## 2023-05-01 DIAGNOSIS — H02.831: ICD-10-CM

## 2023-05-01 DIAGNOSIS — H01.001: ICD-10-CM

## 2023-05-01 DIAGNOSIS — H25.12: ICD-10-CM

## 2023-05-01 DIAGNOSIS — H25.13: ICD-10-CM

## 2023-05-01 DIAGNOSIS — E11.9: ICD-10-CM

## 2023-05-01 DIAGNOSIS — H01.004: ICD-10-CM

## 2023-05-01 PROCEDURE — 92136 OPHTHALMIC BIOMETRY: CPT | Performed by: OPHTHALMOLOGY

## 2023-05-01 PROCEDURE — 99214 OFFICE O/P EST MOD 30 MIN: CPT | Performed by: OPHTHALMOLOGY

## 2023-05-01 ASSESSMENT — AXIALLENGTH_DERIVED
OS_AL: 23.39
OS_AL: 23.39
OD_AL: 23.34
OD_AL: 23.34

## 2023-05-01 ASSESSMENT — KERATOMETRY
OS_K2POWER_DIOPTERS: 44.29
OD_K2POWER_DIOPTERS: 44.23
OD_K2POWER_DIOPTERS: 44.23
OD_CYLPOWER_DEGREES: 2.2
OS_AXISANGLE_DEGREES: 4
OS_K1K2_AVERAGE: 43.67
OD_CYLAXISANGLE_DEGREES: 173
OD_K1POWER_DIOPTERS: 42.03
OS_CYLAXISANGLE_DEGREES: 4
OS_AXISANGLE2_DEGREES: 4
OD_AXISANGLE2_DEGREES: 173
OD_K1POWER_DIOPTERS: 42.03
OS_K1POWER_DIOPTERS: 43.05
OS_K1POWER_DIOPTERS: 43.05
OS_CYLPOWER_DEGREES: 1.24
OS_K2POWER_DIOPTERS: 44.29
OS_AXISANGLE_DEGREES: 94
OD_AXISANGLE_DEGREES: 173
OD_AXISANGLE_DEGREES: 83
OD_K1K2_AVERAGE: 43.13

## 2023-05-01 ASSESSMENT — REFRACTION_CURRENTRX
OD_CYLINDER: -1.50
OS_VPRISM_DIRECTION: PROGS
OS_ADD: +2.25
OS_AXIS: 72
OD_OVR_VA: 20/
OD_SPHERE: +1.25
OD_AXIS: 102
OS_CYLINDER: -1.50
OS_SPHERE: +1.25
OD_ADD: +2.25
OS_OVR_VA: 20/
OD_VPRISM_DIRECTION: PROGS

## 2023-05-01 ASSESSMENT — REFRACTION_MANIFEST
OS_VA2: 20/20
OS_CYLINDER: -1.75
OS_VA1: 20/40+
OD_VA1: 20/40
OD_CYLINDER: -2.50
OD_SPHERE: +2.25
OS_SPHERE: +1.25
OD_VA2: 20/20
OS_AXIS: 075
OS_ADD: +2.50
OD_ADD: +2.50
OD_AXIS: 90

## 2023-05-01 ASSESSMENT — TONOMETRY
OD_IOP_MMHG: 18
OS_IOP_MMHG: 18

## 2023-05-01 ASSESSMENT — REFRACTION_AUTOREFRACTION
OD_AXIS: 89
OS_SPHERE: +1.25
OS_AXIS: 74
OD_SPHERE: +2.25
OS_CYLINDER: -1.75
OD_CYLINDER: -2.50

## 2023-05-01 ASSESSMENT — VISUAL ACUITY
OS_BCVA: 20/40
OD_BCVA: 20/50

## 2023-05-01 ASSESSMENT — LID EXAM ASSESSMENTS
OD_BLEPHARITIS: RLL RUL 1+
OS_BLEPHARITIS: LLL LUL 1+

## 2023-05-01 ASSESSMENT — CONFRONTATIONAL VISUAL FIELD TEST (CVF)
OD_FINDINGS: FULL
OS_FINDINGS: FULL

## 2023-05-01 ASSESSMENT — LID POSITION - DERMATOCHALASIS
OS_DERMATOCHALASIS: LUL 2+
OD_DERMATOCHALASIS: RUL 2+

## 2023-05-01 ASSESSMENT — SPHEQUIV_DERIVED
OS_SPHEQUIV: 0.375
OD_SPHEQUIV: 1
OD_SPHEQUIV: 1
OS_SPHEQUIV: 0.375

## 2023-07-13 ENCOUNTER — ASC (OUTPATIENT)
Dept: URBAN - METROPOLITAN AREA SURGERY 8 | Facility: SURGERY | Age: 78
Setting detail: OPHTHALMOLOGY
End: 2023-07-13
Payer: MEDICARE

## 2023-07-13 DIAGNOSIS — H25.11: ICD-10-CM

## 2023-07-13 PROCEDURE — 66984 XCAPSL CTRC RMVL W/O ECP: CPT | Performed by: OPHTHALMOLOGY

## 2023-07-13 PROCEDURE — 68841 INSJ RX ELUT IMPLT LAC CANAL: CPT | Performed by: OPHTHALMOLOGY

## 2023-07-14 ENCOUNTER — RX ONLY (RX ONLY)
Age: 78
End: 2023-07-14

## 2023-07-14 ENCOUNTER — OFFICE (OUTPATIENT)
Dept: URBAN - METROPOLITAN AREA CLINIC 104 | Facility: CLINIC | Age: 78
Setting detail: OPHTHALMOLOGY
End: 2023-07-14
Payer: MEDICARE

## 2023-07-14 DIAGNOSIS — Z96.1: ICD-10-CM

## 2023-07-14 PROCEDURE — 99024 POSTOP FOLLOW-UP VISIT: CPT | Performed by: OPHTHALMOLOGY

## 2023-07-14 ASSESSMENT — REFRACTION_MANIFEST
OD_SPHERE: +2.25
OS_CYLINDER: -1.75
OS_ADD: +2.50
OD_AXIS: 90
OS_AXIS: 075
OD_VA2: 20/20
OS_VA2: 20/20
OD_CYLINDER: -2.50
OS_VA1: 20/40+
OS_SPHERE: +1.25
OD_ADD: +2.50
OD_VA1: 20/40

## 2023-07-14 ASSESSMENT — REFRACTION_CURRENTRX
OD_SPHERE: +1.25
OD_VPRISM_DIRECTION: PROGS
OS_CYLINDER: -1.50
OS_ADD: +2.25
OD_ADD: +2.25
OS_VPRISM_DIRECTION: PROGS
OD_OVR_VA: 20/
OD_CYLINDER: -1.50
OS_OVR_VA: 20/
OD_AXIS: 102
OS_SPHERE: +1.25
OS_AXIS: 72

## 2023-07-14 ASSESSMENT — REFRACTION_AUTOREFRACTION
OD_AXIS: 084
OS_SPHERE: +1.00
OS_AXIS: 074
OD_SPHERE: +1.75
OD_CYLINDER: -2.50
OS_CYLINDER: -1.75

## 2023-07-14 ASSESSMENT — VISUAL ACUITY
OS_BCVA: 20/40
OD_BCVA: 20/50

## 2023-07-14 ASSESSMENT — LID POSITION - DERMATOCHALASIS
OD_DERMATOCHALASIS: RUL 2+
OS_DERMATOCHALASIS: LUL 2+

## 2023-07-14 ASSESSMENT — KERATOMETRY
OS_AXISANGLE_DEGREES: 179
OD_K2POWER_DIOPTERS: 43.75
OS_K2POWER_DIOPTERS: 44.00
OD_K1POWER_DIOPTERS: 42.00
OS_K1POWER_DIOPTERS: 42.50
OD_AXISANGLE_DEGREES: 173

## 2023-07-14 ASSESSMENT — LID EXAM ASSESSMENTS
OS_BLEPHARITIS: LLL LUL 1+
OD_BLEPHARITIS: RLL RUL 1+

## 2023-07-14 ASSESSMENT — CONFRONTATIONAL VISUAL FIELD TEST (CVF)
OD_FINDINGS: FULL
OS_FINDINGS: FULL

## 2023-07-14 ASSESSMENT — AXIALLENGTH_DERIVED
OD_AL: 23.6267
OD_AL: 23.4329
OS_AL: 23.6351
OS_AL: 23.5378

## 2023-07-14 ASSESSMENT — SPHEQUIV_DERIVED
OD_SPHEQUIV: 0.5
OS_SPHEQUIV: 0.125
OS_SPHEQUIV: 0.375
OD_SPHEQUIV: 1

## 2023-07-14 ASSESSMENT — TONOMETRY: OD_IOP_MMHG: 20

## 2023-07-17 ENCOUNTER — OFFICE (OUTPATIENT)
Dept: URBAN - METROPOLITAN AREA CLINIC 104 | Facility: CLINIC | Age: 78
Setting detail: OPHTHALMOLOGY
End: 2023-07-17
Payer: MEDICARE

## 2023-07-17 DIAGNOSIS — Z96.1: ICD-10-CM

## 2023-07-17 DIAGNOSIS — H25.12: ICD-10-CM

## 2023-07-17 PROCEDURE — 99024 POSTOP FOLLOW-UP VISIT: CPT | Performed by: OPHTHALMOLOGY

## 2023-07-17 ASSESSMENT — REFRACTION_AUTOREFRACTION
OD_SPHERE: +2.25
OS_AXIS: 75
OS_SPHERE: +1.00
OD_CYLINDER: -3.00
OD_AXIS: 85
OS_CYLINDER: -1.75

## 2023-07-17 ASSESSMENT — REFRACTION_MANIFEST
OS_SPHERE: +1.25
OS_CYLINDER: -1.75
OS_AXIS: 75
OS_VA1: 20/40+

## 2023-07-17 ASSESSMENT — SPHEQUIV_DERIVED
OD_SPHEQUIV: 0.75
OS_SPHEQUIV: 0.125
OS_SPHEQUIV: 0.375

## 2023-07-17 ASSESSMENT — KERATOMETRY
OS_AXISANGLE_DEGREES: 4
OD_K1POWER_DIOPTERS: 41.82
OD_AXISANGLE_DEGREES: 171
OS_K2POWER_DIOPTERS: 44.35
OD_K2POWER_DIOPTERS: 44.29
OS_K1POWER_DIOPTERS: 42.67

## 2023-07-17 ASSESSMENT — LID POSITION - DERMATOCHALASIS
OS_DERMATOCHALASIS: LUL 2+
OD_DERMATOCHALASIS: RUL 2+

## 2023-07-17 ASSESSMENT — VISUAL ACUITY
OD_BCVA: 20/50
OS_BCVA: 20/30-2

## 2023-07-17 ASSESSMENT — TONOMETRY: OD_IOP_MMHG: 13

## 2023-07-17 ASSESSMENT — AXIALLENGTH_DERIVED
OS_AL: 23.4431
OS_AL: 23.5396
OD_AL: 23.46

## 2023-07-17 ASSESSMENT — LID EXAM ASSESSMENTS
OS_BLEPHARITIS: LLL LUL 1+
OD_BLEPHARITIS: RLL RUL 1+

## 2023-07-17 ASSESSMENT — REFRACTION_CURRENTRX
OS_OVR_VA: 20/
OD_OVR_VA: 20/

## 2023-07-19 ENCOUNTER — RX ONLY (RX ONLY)
Age: 78
End: 2023-07-19

## 2023-07-20 ENCOUNTER — ASC (OUTPATIENT)
Dept: URBAN - METROPOLITAN AREA SURGERY 8 | Facility: SURGERY | Age: 78
Setting detail: OPHTHALMOLOGY
End: 2023-07-20
Payer: MEDICARE

## 2023-07-20 DIAGNOSIS — H25.12: ICD-10-CM

## 2023-07-20 DIAGNOSIS — H25.89: ICD-10-CM

## 2023-07-20 PROCEDURE — 66982 XCAPSL CTRC RMVL CPLX WO ECP: CPT | Performed by: OPHTHALMOLOGY

## 2023-07-20 PROCEDURE — 68841 INSJ RX ELUT IMPLT LAC CANAL: CPT | Performed by: OPHTHALMOLOGY

## 2023-07-21 ENCOUNTER — OFFICE (OUTPATIENT)
Dept: URBAN - METROPOLITAN AREA CLINIC 104 | Facility: CLINIC | Age: 78
Setting detail: OPHTHALMOLOGY
End: 2023-07-21
Payer: MEDICARE

## 2023-07-21 DIAGNOSIS — Z96.1: ICD-10-CM

## 2023-07-21 PROCEDURE — 99024 POSTOP FOLLOW-UP VISIT: CPT | Performed by: OPHTHALMOLOGY

## 2023-07-21 ASSESSMENT — REFRACTION_AUTOREFRACTION
OS_SPHERE: UNABLE
OD_AXIS: 083
OD_CYLINDER: -2.50
OD_SPHERE: +2.00

## 2023-07-21 ASSESSMENT — VISUAL ACUITY
OS_BCVA: 20/70
OD_BCVA: 20/100

## 2023-07-21 ASSESSMENT — LID POSITION - DERMATOCHALASIS
OD_DERMATOCHALASIS: RUL 2+
OS_DERMATOCHALASIS: LUL 2+

## 2023-07-21 ASSESSMENT — CONFRONTATIONAL VISUAL FIELD TEST (CVF)
OD_FINDINGS: FULL
OS_FINDINGS: FULL

## 2023-07-21 ASSESSMENT — LID EXAM ASSESSMENTS
OS_BLEPHARITIS: LLL LUL 1+
OD_BLEPHARITIS: RLL RUL 1+

## 2023-07-21 ASSESSMENT — KERATOMETRY
OD_K2POWER_DIOPTERS: 44.47
OS_K2POWER_DIOPTERS: 46.68
OS_AXISANGLE_DEGREES: 030
OS_K1POWER_DIOPTERS: 45.36
OD_AXISANGLE_DEGREES: 171
OD_K1POWER_DIOPTERS: 42.03

## 2023-07-21 ASSESSMENT — AXIALLENGTH_DERIVED: OD_AL: 23.3932

## 2023-07-21 ASSESSMENT — SPHEQUIV_DERIVED: OD_SPHEQUIV: 0.75

## 2023-07-21 ASSESSMENT — CORNEAL EDEMA - FOLDS/STRIAE: OS_FOLDSSTRIAE: 2+

## 2023-08-07 ENCOUNTER — OFFICE (OUTPATIENT)
Dept: URBAN - METROPOLITAN AREA CLINIC 104 | Facility: CLINIC | Age: 78
Setting detail: OPHTHALMOLOGY
End: 2023-08-07
Payer: MEDICARE

## 2023-08-07 DIAGNOSIS — Z96.1: ICD-10-CM

## 2023-08-07 PROCEDURE — 99024 POSTOP FOLLOW-UP VISIT: CPT | Performed by: OPHTHALMOLOGY

## 2023-08-07 ASSESSMENT — REFRACTION_AUTOREFRACTION
OS_AXIS: 070
OS_SPHERE: +0.75
OD_AXIS: 081
OD_SPHERE: +2.00
OS_CYLINDER: -0.75
OD_CYLINDER: -2.25

## 2023-08-07 ASSESSMENT — SPHEQUIV_DERIVED
OS_SPHEQUIV: 0.375
OD_SPHEQUIV: 0.875

## 2023-08-07 ASSESSMENT — AXIALLENGTH_DERIVED
OS_AL: 23.3348
OD_AL: 23.3851

## 2023-08-07 ASSESSMENT — KERATOMETRY
OD_AXISANGLE_DEGREES: 168
OS_AXISANGLE_DEGREES: 177
OS_K1POWER_DIOPTERS: 43.21
OD_K2POWER_DIOPTERS: 44.41
OD_K1POWER_DIOPTERS: 41.87
OS_K2POWER_DIOPTERS: 44.41

## 2023-08-07 ASSESSMENT — TONOMETRY
OS_IOP_MMHG: 10
OD_IOP_MMHG: 10

## 2023-08-07 ASSESSMENT — CORNEAL EDEMA - FOLDS/STRIAE: OS_FOLDSSTRIAE: 2+

## 2023-08-07 ASSESSMENT — CONFRONTATIONAL VISUAL FIELD TEST (CVF)
OD_FINDINGS: FULL
OS_FINDINGS: FULL

## 2023-08-07 ASSESSMENT — LID POSITION - DERMATOCHALASIS
OS_DERMATOCHALASIS: LUL 2+
OD_DERMATOCHALASIS: RUL 2+

## 2023-08-07 ASSESSMENT — VISUAL ACUITY
OD_BCVA: 20/30+2
OS_BCVA: 20/60+2

## 2023-08-07 ASSESSMENT — LID EXAM ASSESSMENTS
OS_BLEPHARITIS: LLL LUL 1+
OD_BLEPHARITIS: RLL RUL 1+

## 2023-09-07 ENCOUNTER — OFFICE (OUTPATIENT)
Dept: URBAN - METROPOLITAN AREA CLINIC 104 | Facility: CLINIC | Age: 78
Setting detail: OPHTHALMOLOGY
End: 2023-09-07
Payer: MEDICARE

## 2023-09-07 DIAGNOSIS — H01.001: ICD-10-CM

## 2023-09-07 DIAGNOSIS — Z96.1: ICD-10-CM

## 2023-09-07 DIAGNOSIS — H01.002: ICD-10-CM

## 2023-09-07 DIAGNOSIS — H43.393: ICD-10-CM

## 2023-09-07 DIAGNOSIS — H02.831: ICD-10-CM

## 2023-09-07 DIAGNOSIS — H02.834: ICD-10-CM

## 2023-09-07 DIAGNOSIS — H01.005: ICD-10-CM

## 2023-09-07 DIAGNOSIS — H01.004: ICD-10-CM

## 2023-09-07 DIAGNOSIS — E11.9: ICD-10-CM

## 2023-09-07 PROCEDURE — 99024 POSTOP FOLLOW-UP VISIT: CPT | Performed by: OPHTHALMOLOGY

## 2023-09-07 ASSESSMENT — REFRACTION_AUTOREFRACTION
OS_CYLINDER: -1.75
OS_AXIS: 080
OD_SPHERE: +2.25
OD_CYLINDER: -2.75
OD_AXIS: 086
OS_SPHERE: +1.50

## 2023-09-07 ASSESSMENT — SPHEQUIV_DERIVED
OD_SPHEQUIV: -0.25
OD_SPHEQUIV: 0.875
OS_SPHEQUIV: 0
OS_SPHEQUIV: 0.625

## 2023-09-07 ASSESSMENT — REFRACTION_MANIFEST
OS_ADD: +2.50
OS_VA2: 20/20
OD_VA1: 20/25
OS_AXIS: 080
OD_VA2: 20/20
OS_VA1: 20/25
OS_CYLINDER: -1.00
OS_SPHERE: +0.50
OD_AXIS: 090
OD_CYLINDER: -1.50
OD_ADD: +2.50
OD_SPHERE: +0.50

## 2023-09-07 ASSESSMENT — KERATOMETRY
OD_K2POWER_DIOPTERS: 44.35
OS_K1POWER_DIOPTERS: 43.16
OD_K1POWER_DIOPTERS: 41.62
OS_AXISANGLE_DEGREES: 002
OD_AXISANGLE_DEGREES: 171
OS_K2POWER_DIOPTERS: 44.35

## 2023-09-07 ASSESSMENT — CONFRONTATIONAL VISUAL FIELD TEST (CVF)
OD_FINDINGS: FULL
OS_FINDINGS: FULL

## 2023-09-07 ASSESSMENT — VISUAL ACUITY
OS_BCVA: 20/70
OD_BCVA: 20/25

## 2023-09-07 ASSESSMENT — LID EXAM ASSESSMENTS
OD_BLEPHARITIS: RLL RUL 1+
OS_BLEPHARITIS: LLL LUL 1+

## 2023-09-07 ASSESSMENT — CORNEAL EDEMA - FOLDS/STRIAE: OS_FOLDSSTRIAE: 2+

## 2023-09-07 ASSESSMENT — AXIALLENGTH_DERIVED
OD_AL: 23.88
OS_AL: 23.5
OS_AL: 23.26
OD_AL: 23.44

## 2023-09-07 ASSESSMENT — TONOMETRY
OS_IOP_MMHG: 16
OD_IOP_MMHG: 16

## 2023-09-07 ASSESSMENT — LID POSITION - DERMATOCHALASIS
OD_DERMATOCHALASIS: RUL 2+
OS_DERMATOCHALASIS: LUL 2+

## 2023-12-07 ENCOUNTER — OFFICE (OUTPATIENT)
Dept: URBAN - METROPOLITAN AREA CLINIC 104 | Facility: CLINIC | Age: 78
Setting detail: OPHTHALMOLOGY
End: 2023-12-07
Payer: MEDICARE

## 2023-12-07 DIAGNOSIS — H01.005: ICD-10-CM

## 2023-12-07 DIAGNOSIS — H01.004: ICD-10-CM

## 2023-12-07 DIAGNOSIS — H43.393: ICD-10-CM

## 2023-12-07 DIAGNOSIS — Z96.1: ICD-10-CM

## 2023-12-07 DIAGNOSIS — H02.831: ICD-10-CM

## 2023-12-07 DIAGNOSIS — H01.002: ICD-10-CM

## 2023-12-07 DIAGNOSIS — H02.834: ICD-10-CM

## 2023-12-07 DIAGNOSIS — E11.9: ICD-10-CM

## 2023-12-07 DIAGNOSIS — H01.001: ICD-10-CM

## 2023-12-07 PROCEDURE — 92014 COMPRE OPH EXAM EST PT 1/>: CPT | Performed by: OPHTHALMOLOGY

## 2023-12-07 ASSESSMENT — REFRACTION_AUTOREFRACTION
OS_SPHERE: +1.00
OS_AXIS: 090
OD_SPHERE: +1.75
OD_CYLINDER: -2.25
OD_AXIS: 087
OS_CYLINDER: -1.50

## 2023-12-07 ASSESSMENT — REFRACTION_CURRENTRX
OD_OVR_VA: 20/
OS_OVR_VA: 20/
OD_CYLINDER: -1.25
OS_ADD: +2.25
OD_ADD: +2.25
OS_AXIS: 080
OD_SPHERE: +0.50
OS_SPHERE: +0.50
OS_CYLINDER: -0.75
OD_AXIS: 089

## 2023-12-07 ASSESSMENT — LID EXAM ASSESSMENTS
OS_BLEPHARITIS: LLL LUL 1+
OD_BLEPHARITIS: RLL RUL 1+

## 2023-12-07 ASSESSMENT — REFRACTION_MANIFEST
OS_AXIS: 080
OS_ADD: +2.50
OS_SPHERE: +0.50
OD_VA2: 20/20
OD_SPHERE: +0.50
OS_CYLINDER: -1.00
OD_AXIS: 090
OD_CYLINDER: -1.50
OD_VA1: 20/25
OS_VA1: 20/25
OS_VA2: 20/20
OD_ADD: +2.50

## 2023-12-07 ASSESSMENT — LID POSITION - DERMATOCHALASIS
OS_DERMATOCHALASIS: LUL 2+
OD_DERMATOCHALASIS: RUL 2+

## 2023-12-07 ASSESSMENT — SPHEQUIV_DERIVED
OS_SPHEQUIV: 0.25
OD_SPHEQUIV: 0.625
OD_SPHEQUIV: -0.25
OS_SPHEQUIV: 0

## 2023-12-07 ASSESSMENT — CONFRONTATIONAL VISUAL FIELD TEST (CVF)
OS_FINDINGS: FULL
OD_FINDINGS: FULL

## 2023-12-07 ASSESSMENT — CORNEAL EDEMA - FOLDS/STRIAE: OS_FOLDSSTRIAE: 2+

## 2024-05-21 ENCOUNTER — APPOINTMENT (OUTPATIENT)
Dept: CARDIOLOGY | Facility: CLINIC | Age: 79
End: 2024-05-21
Payer: MEDICARE

## 2024-05-21 VITALS
HEART RATE: 75 BPM | OXYGEN SATURATION: 97 % | WEIGHT: 255 LBS | HEIGHT: 78 IN | BODY MASS INDEX: 29.5 KG/M2 | SYSTOLIC BLOOD PRESSURE: 124 MMHG | RESPIRATION RATE: 16 BRPM | DIASTOLIC BLOOD PRESSURE: 60 MMHG

## 2024-05-21 DIAGNOSIS — G47.33 OBSTRUCTIVE SLEEP APNEA (ADULT) (PEDIATRIC): ICD-10-CM

## 2024-05-21 DIAGNOSIS — I10 ESSENTIAL (PRIMARY) HYPERTENSION: ICD-10-CM

## 2024-05-21 DIAGNOSIS — I65.23 OCCLUSION AND STENOSIS OF BILATERAL CAROTID ARTERIES: ICD-10-CM

## 2024-05-21 DIAGNOSIS — E11.9 TYPE 2 DIABETES MELLITUS W/OUT COMPLICATIONS: ICD-10-CM

## 2024-05-21 DIAGNOSIS — Z87.891 PERSONAL HISTORY OF NICOTINE DEPENDENCE: ICD-10-CM

## 2024-05-21 DIAGNOSIS — R06.02 SHORTNESS OF BREATH: ICD-10-CM

## 2024-05-21 DIAGNOSIS — E78.5 HYPERLIPIDEMIA, UNSPECIFIED: ICD-10-CM

## 2024-05-21 DIAGNOSIS — R07.9 CHEST PAIN, UNSPECIFIED: ICD-10-CM

## 2024-05-21 DIAGNOSIS — R93.1 ABNORMAL FINDINGS ON DIAGNOSTIC IMAGING OF HEART AND CORONARY CIRCULATION: ICD-10-CM

## 2024-05-21 DIAGNOSIS — I77.810 THORACIC AORTIC ECTASIA: ICD-10-CM

## 2024-05-21 PROCEDURE — 99205 OFFICE O/P NEW HI 60 MIN: CPT

## 2024-05-21 PROCEDURE — G2211 COMPLEX E/M VISIT ADD ON: CPT

## 2024-05-21 PROCEDURE — 93000 ELECTROCARDIOGRAM COMPLETE: CPT

## 2024-05-21 RX ORDER — GLUCOSAMINE SULFATE DIPOT CHLR 1000 MG
TABLET ORAL
Refills: 0 | Status: ACTIVE | COMMUNITY

## 2024-05-21 RX ORDER — LEVOTHYROXINE SODIUM 0.07 MG/1
75 TABLET ORAL
Qty: 90 | Refills: 1 | Status: DISCONTINUED | COMMUNITY
Start: 2019-04-29 | End: 2024-05-21

## 2024-05-21 RX ORDER — LEVOTHYROXINE SODIUM 25 UG/1
25 TABLET ORAL
Refills: 0 | Status: ACTIVE | COMMUNITY

## 2024-05-21 NOTE — PHYSICAL EXAM
[Normal] : clear lung fields, good air entry, no respiratory distress [Soft] : abdomen soft [Non Tender] : non-tender [Moves all extremities] : moves all extremities [Alert and Oriented] : alert and oriented [de-identified] : obese [de-identified] : 1+ edema to lower shins, mild statis changes
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
## 2024-05-21 NOTE — ASSESSMENT
[FreeTextEntry1] : ECG: SR, LAFB, PRWP    HDL 37 LDL 36  (1/2024)  A1C 7.2 (1/2024)  CAC 3/2022: Total = 1088 (986 in LAD) C 4/2022: Non obstructive CAD NUCLEAR STRESS 2019: Negative for ischemia ECHO 2022: normal LV function, Aorta 4.2cm, mild MR CAROTID 2022: Mild stenosis 1-15%

## 2024-05-21 NOTE — DISCUSSION/SUMMARY
[EKG obtained to assist in diagnosis and management of assessed problem(s)] : EKG obtained to assist in diagnosis and management of assessed problem(s) [FreeTextEntry1] : Patient is a 80yo M with HTN, DM, GHASSAN on CPAP, HLD, non-obstructive CAD (CAC 1088 with negative Cath 2022), former smoker, thyroid nodules (seen by endocrine), mild carotid stenosis here for cardiac evaluation.  -Noting dyspnea and CP that needs ischemic/cardiac work up given high CAC/risk factors. ECG as well with LAFB -BP controlled today, lipids with LDL at goal but elevated TG and A1C mildly elevated. Continue diet/lifestyle modifications  1. Echo/nuclear stress testing to evaluate CP/SOB in setting of high CAC and risk factors 2. Carotid for surveillance  3. Continue CPAP for GHASSAN, echo to evaluate PAP 4. Diabetes management per PMD. Counselled on diet/weight loss , continue Jardiance 5. Conitnue statin, LDL at goal from 1/2024 6. Continue current antihypertensives, blood pressure is well controlled  7. Follow up after testing

## 2024-05-23 RX ORDER — METOPROLOL TARTRATE 25 MG/1
25 TABLET, FILM COATED ORAL DAILY
Qty: 90 | Refills: 1 | Status: ACTIVE | COMMUNITY
Start: 1900-01-01 | End: 1900-01-01

## 2024-06-19 ENCOUNTER — APPOINTMENT (OUTPATIENT)
Dept: CARDIOLOGY | Facility: CLINIC | Age: 79
End: 2024-06-19
Payer: MEDICARE

## 2024-06-19 PROCEDURE — 93015 CV STRESS TEST SUPVJ I&R: CPT

## 2024-06-19 PROCEDURE — A9500: CPT

## 2024-06-19 PROCEDURE — 78452 HT MUSCLE IMAGE SPECT MULT: CPT

## 2024-06-20 ENCOUNTER — APPOINTMENT (OUTPATIENT)
Dept: CARDIOLOGY | Facility: CLINIC | Age: 79
End: 2024-06-20

## 2024-06-27 ENCOUNTER — APPOINTMENT (OUTPATIENT)
Dept: CARDIOLOGY | Facility: CLINIC | Age: 79
End: 2024-06-27
Payer: MEDICARE

## 2024-06-27 PROCEDURE — 93306 TTE W/DOPPLER COMPLETE: CPT

## 2024-06-27 PROCEDURE — 93880 EXTRACRANIAL BILAT STUDY: CPT

## 2024-07-09 ENCOUNTER — APPOINTMENT (OUTPATIENT)
Dept: CARDIOLOGY | Facility: CLINIC | Age: 79
End: 2024-07-09
Payer: MEDICARE

## 2024-07-09 VITALS
HEART RATE: 64 BPM | HEIGHT: 78 IN | WEIGHT: 263 LBS | RESPIRATION RATE: 16 BRPM | BODY MASS INDEX: 30.43 KG/M2 | DIASTOLIC BLOOD PRESSURE: 71 MMHG | SYSTOLIC BLOOD PRESSURE: 138 MMHG | OXYGEN SATURATION: 96 %

## 2024-07-09 DIAGNOSIS — E78.5 HYPERLIPIDEMIA, UNSPECIFIED: ICD-10-CM

## 2024-07-09 DIAGNOSIS — R93.1 ABNORMAL FINDINGS ON DIAGNOSTIC IMAGING OF HEART AND CORONARY CIRCULATION: ICD-10-CM

## 2024-07-09 DIAGNOSIS — I65.23 OCCLUSION AND STENOSIS OF BILATERAL CAROTID ARTERIES: ICD-10-CM

## 2024-07-09 DIAGNOSIS — G47.33 OBSTRUCTIVE SLEEP APNEA (ADULT) (PEDIATRIC): ICD-10-CM

## 2024-07-09 DIAGNOSIS — I10 ESSENTIAL (PRIMARY) HYPERTENSION: ICD-10-CM

## 2024-07-09 DIAGNOSIS — R07.9 CHEST PAIN, UNSPECIFIED: ICD-10-CM

## 2024-07-09 PROCEDURE — 99214 OFFICE O/P EST MOD 30 MIN: CPT

## 2024-07-09 PROCEDURE — G2211 COMPLEX E/M VISIT ADD ON: CPT

## 2024-10-14 ENCOUNTER — APPOINTMENT (OUTPATIENT)
Dept: CARDIOLOGY | Facility: CLINIC | Age: 79
End: 2024-10-14
Payer: MEDICARE

## 2024-10-14 ENCOUNTER — NON-APPOINTMENT (OUTPATIENT)
Age: 79
End: 2024-10-14

## 2024-10-14 VITALS
SYSTOLIC BLOOD PRESSURE: 144 MMHG | WEIGHT: 251 LBS | HEART RATE: 69 BPM | HEIGHT: 73 IN | BODY MASS INDEX: 33.27 KG/M2 | DIASTOLIC BLOOD PRESSURE: 64 MMHG

## 2024-10-14 DIAGNOSIS — E11.9 TYPE 2 DIABETES MELLITUS W/OUT COMPLICATIONS: ICD-10-CM

## 2024-10-14 DIAGNOSIS — I10 ESSENTIAL (PRIMARY) HYPERTENSION: ICD-10-CM

## 2024-10-14 DIAGNOSIS — I77.810 THORACIC AORTIC ECTASIA: ICD-10-CM

## 2024-10-14 DIAGNOSIS — I65.23 OCCLUSION AND STENOSIS OF BILATERAL CAROTID ARTERIES: ICD-10-CM

## 2024-10-14 DIAGNOSIS — E78.1 PURE HYPERGLYCERIDEMIA: ICD-10-CM

## 2024-10-14 DIAGNOSIS — R93.1 ABNORMAL FINDINGS ON DIAGNOSTIC IMAGING OF HEART AND CORONARY CIRCULATION: ICD-10-CM

## 2024-10-14 DIAGNOSIS — E78.5 HYPERLIPIDEMIA, UNSPECIFIED: ICD-10-CM

## 2024-10-14 PROCEDURE — G2211 COMPLEX E/M VISIT ADD ON: CPT

## 2024-10-14 PROCEDURE — 93000 ELECTROCARDIOGRAM COMPLETE: CPT

## 2024-10-14 PROCEDURE — 99214 OFFICE O/P EST MOD 30 MIN: CPT

## 2024-10-14 RX ORDER — METOPROLOL SUCCINATE 50 MG/1
50 TABLET, EXTENDED RELEASE ORAL DAILY
Qty: 90 | Refills: 3 | Status: ACTIVE | COMMUNITY
Start: 2024-10-14 | End: 1900-01-01

## 2024-10-14 RX ORDER — ICOSAPENT ETHYL 1 G/1
1 CAPSULE ORAL
Qty: 180 | Refills: 2 | Status: ACTIVE | COMMUNITY
Start: 2024-10-14 | End: 1900-01-01

## 2024-10-23 ENCOUNTER — OFFICE (OUTPATIENT)
Dept: URBAN - METROPOLITAN AREA CLINIC 104 | Facility: CLINIC | Age: 79
Setting detail: OPHTHALMOLOGY
End: 2024-10-23
Payer: MEDICARE

## 2024-10-23 ENCOUNTER — RX ONLY (RX ONLY)
Age: 79
End: 2024-10-23

## 2024-10-23 DIAGNOSIS — H26.493: ICD-10-CM

## 2024-10-23 DIAGNOSIS — H02.831: ICD-10-CM

## 2024-10-23 DIAGNOSIS — H43.393: ICD-10-CM

## 2024-10-23 DIAGNOSIS — H02.834: ICD-10-CM

## 2024-10-23 PROBLEM — H11.823 CONJUNCTIVOCHALASIS; BOTH EYES: Status: ACTIVE | Noted: 2024-10-23

## 2024-10-23 PROBLEM — H16.223 DRY EYE SYNDROME K SICCA; BOTH EYES: Status: ACTIVE | Noted: 2024-10-23

## 2024-10-23 PROBLEM — H43.813 POSTERIOR VITREOUS DETACHMENT; BOTH EYES: Status: ACTIVE | Noted: 2024-10-23

## 2024-10-23 PROCEDURE — 92014 COMPRE OPH EXAM EST PT 1/>: CPT | Performed by: OPHTHALMOLOGY

## 2024-10-23 ASSESSMENT — REFRACTION_CURRENTRX
OS_AXIS: 080
OD_OVR_VA: 20/
OD_AXIS: 089
OS_ADD: +2.25
OS_OVR_VA: 20/
OD_CYLINDER: -1.25
OD_SPHERE: +0.50
OD_ADD: +2.25
OS_SPHERE: +0.50
OS_CYLINDER: -0.75

## 2024-10-23 ASSESSMENT — KERATOMETRY
OS_K1POWER_DIOPTERS: 42.99
OD_K1POWER_DIOPTERS: 42.08
OD_AXISANGLE_DEGREES: 174
OS_K2POWER_DIOPTERS: 44.47
OD_K2POWER_DIOPTERS: 44.23
OS_AXISANGLE_DEGREES: 179

## 2024-10-23 ASSESSMENT — REFRACTION_MANIFEST
OD_VA1: 20/25
OD_SPHERE: +0.50
OD_VA2: 20/20
OD_CYLINDER: -1.50
OS_CYLINDER: -1.00
OD_ADD: +2.50
OS_VA2: 20/20
OS_VA1: 20/25
OD_AXIS: 090
OS_ADD: +2.50
OS_AXIS: 080
OS_SPHERE: +0.50

## 2024-10-23 ASSESSMENT — LID POSITION - DERMATOCHALASIS
OS_DERMATOCHALASIS: LUL 2+
OD_DERMATOCHALASIS: RUL 2+

## 2024-10-23 ASSESSMENT — REFRACTION_AUTOREFRACTION
OS_CYLINDER: -2.25
OD_AXIS: 087
OS_AXIS: 082
OD_CYLINDER: -3.25
OD_SPHERE: +2.25
OS_SPHERE: +2.00

## 2024-10-23 ASSESSMENT — SUPERFICIAL PUNCTATE KERATITIS (SPK)
OS_SPK: 1+
OD_SPK: 1+

## 2024-10-23 ASSESSMENT — CORNEAL EDEMA - FOLDS/STRIAE: OS_FOLDSSTRIAE: 2+

## 2024-10-23 ASSESSMENT — LID EXAM ASSESSMENTS
OS_BLEPHARITIS: LLL LUL 1+
OD_BLEPHARITIS: RLL RUL 1+

## 2024-10-23 ASSESSMENT — VISUAL ACUITY
OS_BCVA: 20/30
OD_BCVA: 20/25

## 2024-10-23 ASSESSMENT — TONOMETRY
OD_IOP_MMHG: 14
OS_IOP_MMHG: 12

## 2024-10-23 ASSESSMENT — CONFRONTATIONAL VISUAL FIELD TEST (CVF)
OD_FINDINGS: FULL
OS_FINDINGS: FULL

## 2024-11-04 ENCOUNTER — RX ONLY (RX ONLY)
Age: 79
End: 2024-11-04

## 2024-11-04 ENCOUNTER — OFFICE (OUTPATIENT)
Dept: URBAN - METROPOLITAN AREA CLINIC 104 | Facility: CLINIC | Age: 79
Setting detail: OPHTHALMOLOGY
End: 2024-11-04
Payer: MEDICARE

## 2024-11-04 DIAGNOSIS — H26.491: ICD-10-CM

## 2024-11-04 PROBLEM — H26.492 POSTERIOR CAPSULAR OPACIFICATION; RIGHT EYE, LEFT EYE, BOTH EYES: Status: ACTIVE | Noted: 2024-11-04

## 2024-11-04 PROBLEM — H26.493 POSTERIOR CAPSULAR OPACIFICATION; RIGHT EYE, LEFT EYE, BOTH EYES: Status: ACTIVE | Noted: 2024-11-04

## 2024-11-04 PROCEDURE — 66821 AFTER CATARACT LASER SURGERY: CPT | Mod: RT | Performed by: OPHTHALMOLOGY

## 2024-11-04 ASSESSMENT — REFRACTION_CURRENTRX
OS_CYLINDER: -0.75
OD_AXIS: 089
OD_OVR_VA: 20/
OS_AXIS: 080
OS_ADD: +2.25
OS_SPHERE: +0.50
OS_OVR_VA: 20/
OD_ADD: +2.25
OD_SPHERE: +0.50
OD_CYLINDER: -1.25

## 2024-11-04 ASSESSMENT — REFRACTION_MANIFEST
OD_SPHERE: +0.50
OD_VA2: 20/20
OS_CYLINDER: -1.00
OS_VA1: 20/30
OS_VA2: 20/20
OS_AXIS: 080
OD_ADD: +2.50
OD_CYLINDER: -1.50
OS_SPHERE: +0.50
OD_VA1: 20/40-
OS_ADD: +2.50
OD_AXIS: 090

## 2024-11-04 ASSESSMENT — CONFRONTATIONAL VISUAL FIELD TEST (CVF)
OS_FINDINGS: FULL
OD_FINDINGS: FULL

## 2024-11-04 ASSESSMENT — REFRACTION_AUTOREFRACTION
OS_SPHERE: +2.00
OD_SPHERE: +2.25
OD_AXIS: 087
OS_CYLINDER: -2.25
OS_AXIS: 082
OD_CYLINDER: -3.25

## 2024-11-04 ASSESSMENT — KERATOMETRY
OD_K2POWER_DIOPTERS: 44.23
OS_K1POWER_DIOPTERS: 42.99
OD_AXISANGLE_DEGREES: 174
OS_K2POWER_DIOPTERS: 44.47
OS_AXISANGLE_DEGREES: 179
OD_K1POWER_DIOPTERS: 42.08

## 2024-11-04 ASSESSMENT — VISUAL ACUITY
OD_BCVA: 20/30
OS_BCVA: 20/40

## 2024-12-14 ENCOUNTER — RX RENEWAL (OUTPATIENT)
Age: 79
End: 2024-12-14

## 2024-12-23 ENCOUNTER — OFFICE (OUTPATIENT)
Dept: URBAN - METROPOLITAN AREA CLINIC 104 | Facility: CLINIC | Age: 79
Setting detail: OPHTHALMOLOGY
End: 2024-12-23
Payer: MEDICARE

## 2024-12-23 DIAGNOSIS — H16.223: ICD-10-CM

## 2024-12-23 DIAGNOSIS — H02.831: ICD-10-CM

## 2024-12-23 DIAGNOSIS — H43.393: ICD-10-CM

## 2024-12-23 DIAGNOSIS — H02.834: ICD-10-CM

## 2024-12-23 DIAGNOSIS — Z96.1: ICD-10-CM

## 2024-12-23 DIAGNOSIS — H01.005: ICD-10-CM

## 2024-12-23 DIAGNOSIS — H01.004: ICD-10-CM

## 2024-12-23 DIAGNOSIS — H01.002: ICD-10-CM

## 2024-12-23 DIAGNOSIS — H43.813: ICD-10-CM

## 2024-12-23 DIAGNOSIS — E11.9: ICD-10-CM

## 2024-12-23 DIAGNOSIS — H01.001: ICD-10-CM

## 2024-12-23 DIAGNOSIS — H26.493: ICD-10-CM

## 2024-12-23 PROCEDURE — 99024 POSTOP FOLLOW-UP VISIT: CPT | Performed by: OPHTHALMOLOGY

## 2024-12-23 ASSESSMENT — REFRACTION_MANIFEST
OD_SPHERE: +2.50
OS_VA1: 20/20
OS_ADD: +2.50
OD_ADD: +2.50
OS_SPHERE: +1.75
OS_AXIS: 85
OS_CYLINDER: -2.00
OD_CYLINDER: -3.00
OD_AXIS: 90
OD_VA1: 20/25+

## 2024-12-23 ASSESSMENT — LID POSITION - DERMATOCHALASIS
OS_DERMATOCHALASIS: LUL 2+
OD_DERMATOCHALASIS: RUL 2+

## 2024-12-23 ASSESSMENT — REFRACTION_AUTOREFRACTION
OS_AXIS: 86
OS_CYLINDER: -2.00
OD_CYLINDER: -3.00
OS_SPHERE: +1.75
OD_SPHERE: +2.50
OD_AXIS: 92

## 2024-12-23 ASSESSMENT — TONOMETRY
OS_IOP_MMHG: 14
OD_IOP_MMHG: 14

## 2024-12-23 ASSESSMENT — LID EXAM ASSESSMENTS
OS_BLEPHARITIS: LLL LUL 1+
OD_BLEPHARITIS: RLL RUL 1+

## 2024-12-23 ASSESSMENT — VISUAL ACUITY
OS_BCVA: 20/40
OD_BCVA: 20/30

## 2024-12-23 ASSESSMENT — SUPERFICIAL PUNCTATE KERATITIS (SPK)
OS_SPK: 1+
OD_SPK: 1+

## 2024-12-23 ASSESSMENT — CORNEAL EDEMA - FOLDS/STRIAE: OS_FOLDSSTRIAE: 2+

## 2024-12-23 ASSESSMENT — REFRACTION_CURRENTRX
OD_OVR_VA: 20/
OS_OVR_VA: 20/

## 2025-01-07 ENCOUNTER — APPOINTMENT (OUTPATIENT)
Dept: CARDIOLOGY | Facility: CLINIC | Age: 80
End: 2025-01-07

## 2025-01-22 ENCOUNTER — RX RENEWAL (OUTPATIENT)
Age: 80
End: 2025-01-22

## 2025-02-04 ENCOUNTER — NON-APPOINTMENT (OUTPATIENT)
Age: 80
End: 2025-02-04

## 2025-02-04 ENCOUNTER — APPOINTMENT (OUTPATIENT)
Dept: CARDIOLOGY | Facility: CLINIC | Age: 80
End: 2025-02-04
Payer: MEDICARE

## 2025-02-04 VITALS
DIASTOLIC BLOOD PRESSURE: 58 MMHG | HEART RATE: 72 BPM | BODY MASS INDEX: 33.8 KG/M2 | HEIGHT: 73 IN | WEIGHT: 255 LBS | SYSTOLIC BLOOD PRESSURE: 130 MMHG

## 2025-02-04 DIAGNOSIS — G47.33 OBSTRUCTIVE SLEEP APNEA (ADULT) (PEDIATRIC): ICD-10-CM

## 2025-02-04 DIAGNOSIS — R93.1 ABNORMAL FINDINGS ON DIAGNOSTIC IMAGING OF HEART AND CORONARY CIRCULATION: ICD-10-CM

## 2025-02-04 DIAGNOSIS — I10 ESSENTIAL (PRIMARY) HYPERTENSION: ICD-10-CM

## 2025-02-04 DIAGNOSIS — I65.23 OCCLUSION AND STENOSIS OF BILATERAL CAROTID ARTERIES: ICD-10-CM

## 2025-02-04 DIAGNOSIS — E11.9 TYPE 2 DIABETES MELLITUS W/OUT COMPLICATIONS: ICD-10-CM

## 2025-02-04 PROCEDURE — G2211 COMPLEX E/M VISIT ADD ON: CPT

## 2025-02-04 PROCEDURE — 93000 ELECTROCARDIOGRAM COMPLETE: CPT

## 2025-02-04 PROCEDURE — 99214 OFFICE O/P EST MOD 30 MIN: CPT

## 2025-02-04 RX ORDER — OMEGA-3-ACID ETHYL ESTERS CAPSULES 1 G/1
1 CAPSULE, LIQUID FILLED ORAL
Qty: 360 | Refills: 1 | Status: ACTIVE | COMMUNITY
Start: 2025-02-04 | End: 1900-01-01

## 2025-02-11 ENCOUNTER — NON-APPOINTMENT (OUTPATIENT)
Age: 80
End: 2025-02-11

## 2025-03-20 ENCOUNTER — RX RENEWAL (OUTPATIENT)
Age: 80
End: 2025-03-20

## 2025-03-25 ENCOUNTER — RX RENEWAL (OUTPATIENT)
Age: 80
End: 2025-03-25

## 2025-04-21 ENCOUNTER — OFFICE (OUTPATIENT)
Dept: URBAN - METROPOLITAN AREA CLINIC 104 | Facility: CLINIC | Age: 80
Setting detail: OPHTHALMOLOGY
End: 2025-04-21
Payer: MEDICARE

## 2025-04-21 DIAGNOSIS — H16.223: ICD-10-CM

## 2025-04-21 DIAGNOSIS — H01.001: ICD-10-CM

## 2025-04-21 DIAGNOSIS — E11.9: ICD-10-CM

## 2025-04-21 DIAGNOSIS — H01.004: ICD-10-CM

## 2025-04-21 DIAGNOSIS — H43.393: ICD-10-CM

## 2025-04-21 DIAGNOSIS — H43.813: ICD-10-CM

## 2025-04-21 DIAGNOSIS — H02.831: ICD-10-CM

## 2025-04-21 DIAGNOSIS — H02.834: ICD-10-CM

## 2025-04-21 DIAGNOSIS — H11.823: ICD-10-CM

## 2025-04-21 DIAGNOSIS — H26.493: ICD-10-CM

## 2025-04-21 DIAGNOSIS — H01.002: ICD-10-CM

## 2025-04-21 DIAGNOSIS — H01.005: ICD-10-CM

## 2025-04-21 PROBLEM — B88.0: Status: ACTIVE | Noted: 2025-04-21

## 2025-04-21 PROCEDURE — 99213 OFFICE O/P EST LOW 20 MIN: CPT | Performed by: OPHTHALMOLOGY

## 2025-04-21 ASSESSMENT — SUPERFICIAL PUNCTATE KERATITIS (SPK)
OD_SPK: 1+
OS_SPK: 1+

## 2025-04-21 ASSESSMENT — VISUAL ACUITY
OD_BCVA: 20/20-1
OS_BCVA: 20/30-1

## 2025-04-21 ASSESSMENT — LID EXAM ASSESSMENTS
OS_BLEPHARITIS: LLL LUL 1+
OD_BLEPHARITIS: RLL RUL 1+
OD_COMMENTS: COLLARETS PRESENT
OS_COMMENTS: COLLARETS PRESENT

## 2025-04-21 ASSESSMENT — KERATOMETRY
OD_K2POWER_DIOPTERS: 44.35
OD_AXISANGLE_DEGREES: 175
OS_K2POWER_DIOPTERS: 4.41
OD_K1POWER_DIOPTERS: 41.67
OS_K1POWER_DIOPTERS: 42.78
OS_AXISANGLE_DEGREES: 180

## 2025-04-21 ASSESSMENT — REFRACTION_MANIFEST
OD_ADD: +2.50
OD_CYLINDER: -3.00
OS_CYLINDER: -2.00
OS_SPHERE: +1.75
OD_VA1: 20/25+
OS_VA1: 20/20
OD_SPHERE: +2.50
OD_AXIS: 90
OS_ADD: +2.50
OS_AXIS: 85

## 2025-04-21 ASSESSMENT — LID POSITION - DERMATOCHALASIS
OD_DERMATOCHALASIS: RUL 2+
OS_DERMATOCHALASIS: LUL 2+

## 2025-04-21 ASSESSMENT — REFRACTION_AUTOREFRACTION
OD_CYLINDER: -3.00
OS_CYLINDER: -2.00
OD_AXIS: 089
OD_SPHERE: +2.00
OS_SPHERE: +1.75
OS_AXIS: 084

## 2025-04-21 ASSESSMENT — CONFRONTATIONAL VISUAL FIELD TEST (CVF)
OS_FINDINGS: FULL
OD_FINDINGS: FULL

## 2025-04-21 ASSESSMENT — TONOMETRY
OS_IOP_MMHG: 17
OD_IOP_MMHG: 17

## 2025-06-04 ENCOUNTER — RX RENEWAL (OUTPATIENT)
Age: 80
End: 2025-06-04

## 2025-07-23 ENCOUNTER — APPOINTMENT (OUTPATIENT)
Dept: CARDIOLOGY | Facility: CLINIC | Age: 80
End: 2025-07-23
Payer: MEDICARE

## 2025-07-23 VITALS
SYSTOLIC BLOOD PRESSURE: 140 MMHG | WEIGHT: 255 LBS | DIASTOLIC BLOOD PRESSURE: 60 MMHG | HEART RATE: 60 BPM | HEIGHT: 73 IN | BODY MASS INDEX: 33.8 KG/M2

## 2025-07-23 DIAGNOSIS — R93.1 ABNORMAL FINDINGS ON DIAGNOSTIC IMAGING OF HEART AND CORONARY CIRCULATION: ICD-10-CM

## 2025-07-23 DIAGNOSIS — I77.810 THORACIC AORTIC ECTASIA: ICD-10-CM

## 2025-07-23 DIAGNOSIS — G47.33 OBSTRUCTIVE SLEEP APNEA (ADULT) (PEDIATRIC): ICD-10-CM

## 2025-07-23 DIAGNOSIS — I65.23 OCCLUSION AND STENOSIS OF BILATERAL CAROTID ARTERIES: ICD-10-CM

## 2025-07-23 DIAGNOSIS — E78.5 HYPERLIPIDEMIA, UNSPECIFIED: ICD-10-CM

## 2025-07-23 DIAGNOSIS — E11.9 TYPE 2 DIABETES MELLITUS W/OUT COMPLICATIONS: ICD-10-CM

## 2025-07-23 DIAGNOSIS — I10 ESSENTIAL (PRIMARY) HYPERTENSION: ICD-10-CM

## 2025-07-23 PROCEDURE — G2211 COMPLEX E/M VISIT ADD ON: CPT

## 2025-07-23 PROCEDURE — 93000 ELECTROCARDIOGRAM COMPLETE: CPT

## 2025-07-23 PROCEDURE — 99214 OFFICE O/P EST MOD 30 MIN: CPT

## 2025-07-24 ENCOUNTER — RX RENEWAL (OUTPATIENT)
Age: 80
End: 2025-07-24

## 2025-07-25 ENCOUNTER — OFFICE (OUTPATIENT)
Dept: URBAN - METROPOLITAN AREA CLINIC 104 | Facility: CLINIC | Age: 80
Setting detail: OPHTHALMOLOGY
End: 2025-07-25
Payer: MEDICARE

## 2025-07-25 DIAGNOSIS — Z96.1: ICD-10-CM

## 2025-07-25 DIAGNOSIS — H16.223: ICD-10-CM

## 2025-07-25 DIAGNOSIS — H43.393: ICD-10-CM

## 2025-07-25 DIAGNOSIS — H01.002: ICD-10-CM

## 2025-07-25 DIAGNOSIS — H43.813: ICD-10-CM

## 2025-07-25 DIAGNOSIS — H02.834: ICD-10-CM

## 2025-07-25 DIAGNOSIS — H01.005: ICD-10-CM

## 2025-07-25 DIAGNOSIS — H11.153: ICD-10-CM

## 2025-07-25 DIAGNOSIS — H02.831: ICD-10-CM

## 2025-07-25 DIAGNOSIS — H01.004: ICD-10-CM

## 2025-07-25 DIAGNOSIS — H01.001: ICD-10-CM

## 2025-07-25 DIAGNOSIS — E11.9: ICD-10-CM

## 2025-07-25 PROCEDURE — 92015 DETERMINE REFRACTIVE STATE: CPT | Performed by: OPHTHALMOLOGY

## 2025-07-25 PROCEDURE — 99213 OFFICE O/P EST LOW 20 MIN: CPT | Performed by: OPHTHALMOLOGY

## 2025-07-25 ASSESSMENT — KERATOMETRY
OD_K1POWER_DIOPTERS: 42.00
OS_K2POWER_DIOPTERS: 44.50
OD_K2POWER_DIOPTERS: 44.25
OD_AXISANGLE_DEGREES: 175
OS_AXISANGLE_DEGREES: 178
OS_K1POWER_DIOPTERS: 43.00

## 2025-07-25 ASSESSMENT — CONFRONTATIONAL VISUAL FIELD TEST (CVF)
OS_FINDINGS: FULL
OD_FINDINGS: FULL

## 2025-07-25 ASSESSMENT — REFRACTION_MANIFEST
OD_VA1: 20/20
OS_AXIS: 080
OD_CYLINDER: -2.25
OD_SPHERE: +1.25
OS_SPHERE: +1.00
OS_ADD: +2.75
OD_AXIS: 090
OS_CYLINDER: -1.50
OD_ADD: +2.75
OS_VA1: 20/20

## 2025-07-25 ASSESSMENT — REFRACTION_AUTOREFRACTION
OS_CYLINDER: -2.00
OS_AXIS: 81
OD_AXIS: 88
OS_SPHERE: +1.50
OD_SPHERE: +2.00
OD_CYLINDER: -2.75

## 2025-07-25 ASSESSMENT — LID EXAM ASSESSMENTS
OD_BLEPHARITIS: RLL RUL 1+
OS_COMMENTS: COLLARETS PRESENT
OS_BLEPHARITIS: LLL LUL 1+
OD_COMMENTS: COLLARETS PRESENT

## 2025-07-25 ASSESSMENT — SUPERFICIAL PUNCTATE KERATITIS (SPK)
OD_SPK: T
OS_SPK: T

## 2025-07-25 ASSESSMENT — VISUAL ACUITY
OS_BCVA: 20/25
OD_BCVA: 20/20-1

## 2025-07-25 ASSESSMENT — LID POSITION - DERMATOCHALASIS
OD_DERMATOCHALASIS: RUL 2+
OS_DERMATOCHALASIS: LUL 2+

## 2025-07-25 ASSESSMENT — TONOMETRY
OD_IOP_MMHG: 15
OS_IOP_MMHG: 15

## 2025-09-11 ENCOUNTER — RX RENEWAL (OUTPATIENT)
Age: 80
End: 2025-09-11

## 2025-09-19 ENCOUNTER — APPOINTMENT (OUTPATIENT)
Dept: CARDIOLOGY | Facility: CLINIC | Age: 80
End: 2025-09-19
Payer: MEDICARE

## 2025-09-19 VITALS
DIASTOLIC BLOOD PRESSURE: 60 MMHG | RESPIRATION RATE: 13 BRPM | SYSTOLIC BLOOD PRESSURE: 126 MMHG | WEIGHT: 255 LBS | BODY MASS INDEX: 33.64 KG/M2 | OXYGEN SATURATION: 95 % | HEART RATE: 60 BPM

## 2025-09-19 DIAGNOSIS — I77.810 THORACIC AORTIC ECTASIA: ICD-10-CM

## 2025-09-19 DIAGNOSIS — E11.9 TYPE 2 DIABETES MELLITUS W/OUT COMPLICATIONS: ICD-10-CM

## 2025-09-19 DIAGNOSIS — R93.1 ABNORMAL FINDINGS ON DIAGNOSTIC IMAGING OF HEART AND CORONARY CIRCULATION: ICD-10-CM

## 2025-09-19 DIAGNOSIS — E03.9 HYPOTHYROIDISM, UNSPECIFIED: ICD-10-CM

## 2025-09-19 DIAGNOSIS — G47.33 OBSTRUCTIVE SLEEP APNEA (ADULT) (PEDIATRIC): ICD-10-CM

## 2025-09-19 DIAGNOSIS — Z01.810 ENCOUNTER FOR PREPROCEDURAL CARDIOVASCULAR EXAMINATION: ICD-10-CM

## 2025-09-19 DIAGNOSIS — I10 ESSENTIAL (PRIMARY) HYPERTENSION: ICD-10-CM

## 2025-09-19 DIAGNOSIS — E78.5 HYPERLIPIDEMIA, UNSPECIFIED: ICD-10-CM

## 2025-09-19 DIAGNOSIS — R06.02 SHORTNESS OF BREATH: ICD-10-CM

## 2025-09-19 PROCEDURE — 93000 ELECTROCARDIOGRAM COMPLETE: CPT

## 2025-09-19 PROCEDURE — 99214 OFFICE O/P EST MOD 30 MIN: CPT | Mod: 25
